# Patient Record
Sex: MALE | Race: WHITE | NOT HISPANIC OR LATINO | Employment: FULL TIME | URBAN - NONMETROPOLITAN AREA
[De-identification: names, ages, dates, MRNs, and addresses within clinical notes are randomized per-mention and may not be internally consistent; named-entity substitution may affect disease eponyms.]

---

## 2017-10-17 ENCOUNTER — OFFICE VISIT (OUTPATIENT)
Dept: UROLOGY | Facility: CLINIC | Age: 57
End: 2017-10-17

## 2017-10-17 VITALS
HEIGHT: 72 IN | TEMPERATURE: 98.6 F | BODY MASS INDEX: 32.51 KG/M2 | WEIGHT: 240 LBS | DIASTOLIC BLOOD PRESSURE: 77 MMHG | SYSTOLIC BLOOD PRESSURE: 128 MMHG

## 2017-10-17 DIAGNOSIS — R97.20 ELEVATED PSA: ICD-10-CM

## 2017-10-17 DIAGNOSIS — N40.1 BENIGN PROSTATIC HYPERPLASIA WITH LOWER URINARY TRACT SYMPTOMS, SYMPTOM DETAILS UNSPECIFIED: ICD-10-CM

## 2017-10-17 DIAGNOSIS — N39.0 URINARY TRACT INFECTION WITHOUT HEMATURIA, SITE UNSPECIFIED: Primary | ICD-10-CM

## 2017-10-17 LAB
BILIRUB BLD-MCNC: NEGATIVE MG/DL
CLARITY, POC: CLEAR
COLOR UR: YELLOW
GLUCOSE UR STRIP-MCNC: NEGATIVE MG/DL
KETONES UR QL: NEGATIVE
LEUKOCYTE EST, POC: NEGATIVE
NITRITE UR-MCNC: NEGATIVE MG/ML
PH UR: 5.5 [PH] (ref 5–8)
PROT UR STRIP-MCNC: NEGATIVE MG/DL
RBC # UR STRIP: NEGATIVE /UL
SP GR UR: 1.03 (ref 1–1.03)
UROBILINOGEN UR QL: NORMAL

## 2017-10-17 PROCEDURE — 51798 US URINE CAPACITY MEASURE: CPT | Performed by: UROLOGY

## 2017-10-17 PROCEDURE — 81003 URINALYSIS AUTO W/O SCOPE: CPT | Performed by: UROLOGY

## 2017-10-17 PROCEDURE — 99204 OFFICE O/P NEW MOD 45 MIN: CPT | Performed by: UROLOGY

## 2017-10-17 RX ORDER — SULFAMETHOXAZOLE AND TRIMETHOPRIM 800; 160 MG/1; MG/1
1 TABLET ORAL 2 TIMES DAILY
COMMUNITY
End: 2017-12-05

## 2017-10-17 NOTE — PROGRESS NOTES
Subjective    Mr. Delgado is 57 y.o. male    CHIEF COMPLAINT: Recurrent urinary tract infections    The patient comes in today has an interesting history in about a year ago he had a urinary tract infection apparently spontaneous fever chills dysuria was found to have Escherichia coli was treated with antibiotics for couple weeks and then it resolved that was his first infection however he did well until recently when he had recurrent symptoms again.  He was once again given antibiotic and took about 3-4 days her symptoms resolve but today he is asymptomatic in about a week ago that he got the antibiotics which was Bactrim DS.    His cultures did show Escherichia coli both times.    He is in between times she did not have any significant symptoms as a way score today is 14 I does not taking medications for his prostate I's had no gross hematuria no history of stones he is had no upper tract workup X rays no previous  operations    He also he is been found have an elevated PSA unfortunately both times it was drawn was when he had an acute urinary tract infection but does not look like he had any follow-up I discussed this with him there is no family history of prostate cancer and again he is not had any PSAs except those to that he got but while infected      History of Present Illness      The following portions of the patient's history were reviewed and updated as appropriate: allergies, current medications, past family history, past medical history, past social history, past surgical history and problem list.    Review of Systems   Constitutional: Negative for appetite change and fever.   HENT: Negative for hearing loss and sore throat.    Eyes: Negative for pain and redness.   Respiratory: Negative for cough and shortness of breath.    Cardiovascular: Negative for chest pain and leg swelling.   Gastrointestinal: Negative for nausea and vomiting.   Endocrine: Negative for cold intolerance and heat intolerance.  "  Genitourinary: Negative for difficulty urinating, dysuria, flank pain, frequency, hematuria and urgency.   Musculoskeletal: Negative for joint swelling and myalgias.   Skin: Negative for color change and rash.   Allergic/Immunologic: Negative for immunocompromised state.   Neurological: Negative for dizziness and speech difficulty.   Hematological: Negative for adenopathy. Does not bruise/bleed easily.   Psychiatric/Behavioral: Negative for dysphoric mood and suicidal ideas.         Current Outpatient Prescriptions:   •  sulfamethoxazole-trimethoprim (BACTRIM DS,SEPTRA DS) 800-160 MG per tablet, Take 1 tablet by mouth 2 (Two) Times a Day., Disp: , Rfl:     History reviewed. No pertinent past medical history.    History reviewed. No pertinent surgical history.    Social History     Social History   • Marital status: Single     Spouse name: N/A   • Number of children: N/A   • Years of education: N/A     Social History Main Topics   • Smoking status: Never Smoker   • Smokeless tobacco: Never Used   • Alcohol use None   • Drug use: None   • Sexual activity: Not Asked     Other Topics Concern   • None     Social History Narrative   • None       Family History   Problem Relation Age of Onset   • No Known Problems Father    • No Known Problems Mother        Objective    /77  Temp 98.6 °F (37 °C)  Ht 72\" (182.9 cm)  Wt 240 lb (109 kg)  BMI 32.55 kg/m2    Physical Exam   Constitutional: He is oriented to person, place, and time. He appears well-developed and well-nourished. No distress.   HENT:   Head: Normocephalic.   Nose: Nose normal.   Eyes: Conjunctivae are normal. No scleral icterus.   Neck: Normal range of motion. Neck supple. No tracheal deviation present. No thyromegaly present.   Cardiovascular: Normal rate and regular rhythm.    No significant edema or tenderness    Pulmonary/Chest: Effort normal. No accessory muscle usage. No respiratory distress.   Abdominal: Soft. Bowel sounds are normal. He " exhibits no distension, no ascites and no mass. There is no hepatosplenomegaly. There is no tenderness. There is no rebound, no guarding and no CVA tenderness. No hernia. Hernia confirmed negative in the right inguinal area and confirmed negative in the left inguinal area.   Soft, slightly poverweight  Non tender    Stool specimen is not indicated for my portion of the exam   Genitourinary: Testes normal and penis normal. Rectal exam shows no mass, no tenderness, anal tone normal and guaiac negative stool. Tender: no nodules. Right testis shows no mass, no swelling and no tenderness. Left testis shows no mass, no swelling and no tenderness. Uncircumcised. No penile tenderness (no lesion or deformities).   Genitourinary Comments:  The urethral meatus normal in position without evidence of stricture. Epididymis without mass or tenderness. Vas Deferens is palpably normal.Anus and perineum without mass or tenderness. The seminal vesicle are without mass or enlargement. The prostate is approximately 25 ml. It is Symmetric, with a Soft consistency. There are no nodules present. . The seminal vesicles are Not palpable due to the size of the prostate.     Lymphadenopathy:     He has no cervical adenopathy. No inguinal adenopathy noted on the right or left side.        Right: No inguinal adenopathy present.        Left: No inguinal adenopathy present.   Neurological: He is alert and oriented to person, place, and time.   Skin: Skin is warm and dry. No rash noted. He is not diaphoretic. No pallor.   Psychiatric: He has a normal mood and affect. His behavior is normal.   Vitals reviewed.        No results found for any previous visit.    Results for orders placed or performed in visit on 10/17/17   POC Urinalysis Dipstick, Automated   Result Value Ref Range    Color Yellow Yellow, Straw, Dark Yellow, Mitali    Clarity, UA Clear Clear    Glucose, UA Negative Negative, 1000 mg/dL (3+) mg/dL    Bilirubin Negative Negative     Ketones, UA Negative Negative    Specific Gravity  1.030 1.005 - 1.030    Blood, UA Negative Negative    pH, Urine 5.5 5.0 - 8.0    Protein, POC Negative Negative mg/dL    Urobilinogen, UA Normal Normal    Leukocytes Negative Negative    Nitrite, UA Negative Negative       Bladder Scan interpretation  Estimation of residual urine via abdominal ultrasound  Residual Urine: 152 ml  Indication: UTI  Position: Supine  Examination: Incremental scanning of the suprapubic area using 3 MHz transducer using copious amounts of acoustic gel.   Findings: An anechoic area was demonstrated which represented the bladder, with measurement of residual urine as noted. I inspected this myself. In that the residual urine was stable or insignificant, no treatment will be necessary at this time.         Assessment and Plan    Diagnoses and all orders for this visit:    Urinary tract infection without hematuria, site unspecified  -     POC Urinalysis Dipstick, Automated  -     XR pyelogram iv with tomography; Future    Benign prostatic hyperplasia with lower urinary tract symptoms, symptom details unspecified    Elevated PSA    Plan--I indicated to the patient from a urinary tract infection point review does not comment for patient's best female so have recurrent infections and we will look for an etiology he has no insurance on when to go ahead and get an IVP which should shows any obstruction or stones that may be causing this.    From elevated PSA point review I told him again I am hopeful that this is because he was infected both times I do want to wait to 3 months we will repeat that again down the road to make sure comes back down to normal I think he understands everything clearly we will seen back next week with an IVP and a cystoscopy sooner as needed

## 2017-10-24 ENCOUNTER — PROCEDURE VISIT (OUTPATIENT)
Dept: UROLOGY | Facility: CLINIC | Age: 57
End: 2017-10-24

## 2017-10-24 DIAGNOSIS — N40.1 BENIGN PROSTATIC HYPERPLASIA WITH LOWER URINARY TRACT SYMPTOMS, SYMPTOM DETAILS UNSPECIFIED: ICD-10-CM

## 2017-10-24 DIAGNOSIS — N39.0 URINARY TRACT INFECTION WITHOUT HEMATURIA, SITE UNSPECIFIED: Primary | ICD-10-CM

## 2017-10-24 LAB
BILIRUB BLD-MCNC: NEGATIVE MG/DL
CLARITY, POC: CLEAR
COLOR UR: YELLOW
GLUCOSE UR STRIP-MCNC: NEGATIVE MG/DL
KETONES UR QL: NEGATIVE
LEUKOCYTE EST, POC: NEGATIVE
NITRITE UR-MCNC: NEGATIVE MG/ML
PH UR: 5.5 [PH] (ref 5–8)
PROT UR STRIP-MCNC: NEGATIVE MG/DL
RBC # UR STRIP: NEGATIVE /UL
SP GR UR: 1.02 (ref 1–1.03)
UROBILINOGEN UR QL: NORMAL

## 2017-10-24 PROCEDURE — 81003 URINALYSIS AUTO W/O SCOPE: CPT | Performed by: UROLOGY

## 2017-10-24 PROCEDURE — 52000 CYSTOURETHROSCOPY: CPT | Performed by: UROLOGY

## 2017-10-24 NOTE — PROGRESS NOTES
.Pre- operative diagnosis:  Benign prostatic hypertrophy    Post operative diagnosis:  Same    Procedure:  The patient was prepped and draped in a normal sterile fashion.  The urethra was anesthetized with 2% lidocaine jelly.  A flexible cystoscope was introduced per urethra.      Urethra:  Normal    Bladder:  There is no evidence of a stone, foreign body or mass within the bladder.  The bladder is minimally trabeculated.  The bladder neck is without contracture., Normal mucosa and No bladder tumor    Ureteral orifices:  Normal position bilaterally and Clear efflux bilaterally    Prostate:  lateral lobe hypertrophyminimal    Patient tolerated the procedure well    Complications: none    Blood loss: minimal    Follow up:    Routine follow up    The patient is doing well his urinalysis is again clear today unfortunately he did not get the IVP is ordered some was seen back again in 6 weeks we will want to repeat that PSA at that time and also we will do an IVP that he had no further questions

## 2017-10-29 ENCOUNTER — RESULTS ENCOUNTER (OUTPATIENT)
Dept: UROLOGY | Facility: CLINIC | Age: 57
End: 2017-10-29

## 2017-10-29 DIAGNOSIS — N39.0 URINARY TRACT INFECTION WITHOUT HEMATURIA, SITE UNSPECIFIED: ICD-10-CM

## 2017-12-05 ENCOUNTER — HOSPITAL ENCOUNTER (OUTPATIENT)
Dept: GENERAL RADIOLOGY | Facility: HOSPITAL | Age: 57
Discharge: HOME | End: 2017-12-05
Attending: UROLOGY | Admitting: UROLOGY

## 2017-12-05 ENCOUNTER — OFFICE VISIT (OUTPATIENT)
Dept: UROLOGY | Facility: CLINIC | Age: 57
End: 2017-12-05

## 2017-12-05 VITALS — BODY MASS INDEX: 33.05 KG/M2 | HEIGHT: 72 IN | WEIGHT: 244 LBS | TEMPERATURE: 97.3 F

## 2017-12-05 DIAGNOSIS — N20.0 RENAL STONE: ICD-10-CM

## 2017-12-05 DIAGNOSIS — N40.1 BENIGN PROSTATIC HYPERPLASIA WITH LOWER URINARY TRACT SYMPTOMS, SYMPTOM DETAILS UNSPECIFIED: Primary | ICD-10-CM

## 2017-12-05 DIAGNOSIS — N39.0 URINARY TRACT INFECTION WITHOUT HEMATURIA, SITE UNSPECIFIED: ICD-10-CM

## 2017-12-05 DIAGNOSIS — R97.20 ELEVATED PSA: ICD-10-CM

## 2017-12-05 LAB
BILIRUB BLD-MCNC: NEGATIVE MG/DL
CLARITY, POC: CLEAR
COLOR UR: YELLOW
CREAT BLDA-MCNC: 1 MG/DL (ref 0.6–1.3)
GLUCOSE UR STRIP-MCNC: NEGATIVE MG/DL
KETONES UR QL: NEGATIVE
LEUKOCYTE EST, POC: NEGATIVE
NITRITE UR-MCNC: NEGATIVE MG/ML
PH UR: 5 [PH] (ref 5–8)
PROT UR STRIP-MCNC: NEGATIVE MG/DL
PSA SERPL-MCNC: 3.59 NG/ML (ref 0–4)
RBC # UR STRIP: ABNORMAL /UL
SP GR UR: 1.01 (ref 1–1.03)
UROBILINOGEN UR QL: NORMAL

## 2017-12-05 PROCEDURE — 0 IOPAMIDOL 61 % SOLUTION: Performed by: UROLOGY

## 2017-12-05 PROCEDURE — 81003 URINALYSIS AUTO W/O SCOPE: CPT | Performed by: UROLOGY

## 2017-12-05 PROCEDURE — 82565 ASSAY OF CREATININE: CPT

## 2017-12-05 PROCEDURE — 74400 UROGRAPHY IV +-KUB TOMOG: CPT

## 2017-12-05 PROCEDURE — 99213 OFFICE O/P EST LOW 20 MIN: CPT | Performed by: UROLOGY

## 2017-12-05 RX ADMIN — IOPAMIDOL 100 ML: 612 INJECTION, SOLUTION INTRAVENOUS at 09:48

## 2017-12-05 NOTE — PROGRESS NOTES
Subjective    Mr. Delgado is 57 y.o. male    CHIEF COMPLAINT: Recurrent UTI    The patient is back today with an IVP this was reviewed and shows only a small nonobstructing stone in the left lower pole otherwise I felt it was perfectly normal and no evidence of any any etiology for recurrent UTIs    He also has a previous history of elevated PSA that was drawn during infection he has not had one since I do want to go ahead and repeat that today.    If that is normal and on when checking back in a year with a repeat PSA    He is asymptomatic and his urine is clear today        History of Present Illness      The following portions of the patient's history were reviewed and updated as appropriate: allergies, current medications, past family history, past medical history, past social history, past surgical history and problem list.    Review of Systems   Constitutional: Negative.  Negative for chills and fever.   Gastrointestinal: Negative for abdominal distention, abdominal pain, anal bleeding, blood in stool and nausea.   Genitourinary: Negative for difficulty urinating, dysuria, flank pain, frequency, hematuria and urgency.   Psychiatric/Behavioral: Negative.  Negative for agitation and confusion.       No current outpatient prescriptions on file.  No current facility-administered medications for this visit.     Facility-Administered Medications Ordered in Other Visits:   •  iopamidol (ISOVUE-300) 61 % injection 10 mL, 10 mL, Intravenous, Once in imaging, Yash Matos MD    History reviewed. No pertinent past medical history.    No past surgical history on file.    Social History     Social History   • Marital status: Single     Spouse name: N/A   • Number of children: N/A   • Years of education: N/A     Social History Main Topics   • Smoking status: Never Smoker   • Smokeless tobacco: Never Used   • Alcohol use None   • Drug use: None   • Sexual activity: Not Asked     Other Topics Concern   • None     Social  "History Narrative       Family History   Problem Relation Age of Onset   • No Known Problems Father    • No Known Problems Mother        Objective    Temp 97.3 °F (36.3 °C)  Ht 182.9 cm (72\")  Wt 111 kg (244 lb)  BMI 33.09 kg/m2    Physical Exam      Procedure visit on 10/24/2017   Component Date Value Ref Range Status   • Color 10/24/2017 Yellow  Yellow, Straw, Dark Yellow, Mitali Final   • Clarity, UA 10/24/2017 Clear  Clear Final   • Glucose, UA 10/24/2017 Negative  Negative, 1000 mg/dL (3+) mg/dL Final   • Bilirubin 10/24/2017 Negative  Negative Final   • Ketones, UA 10/24/2017 Negative  Negative Final   • Specific Gravity  10/24/2017 1.025  1.005 - 1.030 Final   • Blood, UA 10/24/2017 Negative  Negative Final   • pH, Urine 10/24/2017 5.5  5.0 - 8.0 Final   • Protein, POC 10/24/2017 Negative  Negative mg/dL Final   • Urobilinogen, UA 10/24/2017 Normal  Normal Final   • Leukocytes 10/24/2017 Negative  Negative Final   • Nitrite, UA 10/24/2017 Negative  Negative Final       Results for orders placed or performed in visit on 12/05/17   POC Urinalysis Dipstick, Automated   Result Value Ref Range    Color Yellow Yellow, Straw, Dark Yellow, Mitali    Clarity, UA Clear Clear    Glucose, UA Negative Negative, 1000 mg/dL (3+) mg/dL    Bilirubin Negative Negative    Ketones, UA Negative Negative    Specific Gravity  1.010 1.005 - 1.030    Blood, UA Trace (A) Negative    pH, Urine 5.0 5.0 - 8.0    Protein, POC Negative Negative mg/dL    Urobilinogen, UA Normal Normal    Leukocytes Negative Negative    Nitrite, UA Negative Negative       Assessment and Plan    Diagnoses and all orders for this visit:    Benign prostatic hyperplasia with lower urinary tract symptoms, symptom details unspecified  -     POC Urinalysis Dipstick, Automated  -     PSA  -     PSA; Future    Renal stone  -     XR Abdomen KUB; Future    Elevated PSA    Plan--I discussed a renal stone within this is a new diagnosis and this is nonobstructing I just " recommended a follow-up in a year with a KUB    I have see no evidence of any etiology for recurrent UTIs cystoscopy was basically benign and a CT shows only a nonobstructing small stones so what I do not think this is causing this    I did discussed the PSA with him I were going to seen back in a year with a PSA and we will check one today and leg wasresultsofitsabnormalthanwewillneedtoplananotherfollow-upIthinkheunderstoodeverythingclearly

## 2017-12-10 ENCOUNTER — RESULTS ENCOUNTER (OUTPATIENT)
Dept: UROLOGY | Facility: CLINIC | Age: 57
End: 2017-12-10

## 2017-12-10 DIAGNOSIS — N40.1 BENIGN PROSTATIC HYPERPLASIA WITH LOWER URINARY TRACT SYMPTOMS, SYMPTOM DETAILS UNSPECIFIED: ICD-10-CM

## 2017-12-20 ENCOUNTER — TELEPHONE (OUTPATIENT)
Dept: UROLOGY | Facility: CLINIC | Age: 57
End: 2017-12-20

## 2018-04-10 ENCOUNTER — TELEPHONE (OUTPATIENT)
Dept: UROLOGY | Facility: CLINIC | Age: 58
End: 2018-04-10

## 2018-04-10 DIAGNOSIS — N20.0 RENAL STONE: Primary | ICD-10-CM

## 2018-04-13 ENCOUNTER — HOSPITAL ENCOUNTER (OUTPATIENT)
Dept: GENERAL RADIOLOGY | Facility: HOSPITAL | Age: 58
Discharge: HOME | End: 2018-04-13
Attending: UROLOGY | Admitting: UROLOGY

## 2018-04-13 ENCOUNTER — OFFICE VISIT (OUTPATIENT)
Dept: UROLOGY | Facility: CLINIC | Age: 58
End: 2018-04-13

## 2018-04-13 VITALS
TEMPERATURE: 96.5 F | BODY MASS INDEX: 33.18 KG/M2 | WEIGHT: 245 LBS | HEIGHT: 72 IN | DIASTOLIC BLOOD PRESSURE: 88 MMHG | SYSTOLIC BLOOD PRESSURE: 180 MMHG

## 2018-04-13 DIAGNOSIS — N20.0 RENAL STONE: Primary | ICD-10-CM

## 2018-04-13 DIAGNOSIS — N40.1 BENIGN PROSTATIC HYPERPLASIA WITH LOWER URINARY TRACT SYMPTOMS, SYMPTOM DETAILS UNSPECIFIED: ICD-10-CM

## 2018-04-13 DIAGNOSIS — N20.1 URETERAL STONE: ICD-10-CM

## 2018-04-13 DIAGNOSIS — N20.0 RENAL STONE: ICD-10-CM

## 2018-04-13 DIAGNOSIS — R97.20 ELEVATED PSA: ICD-10-CM

## 2018-04-13 PROCEDURE — 81001 URINALYSIS AUTO W/SCOPE: CPT | Performed by: UROLOGY

## 2018-04-13 PROCEDURE — 74018 RADEX ABDOMEN 1 VIEW: CPT

## 2018-04-13 PROCEDURE — 99214 OFFICE O/P EST MOD 30 MIN: CPT | Performed by: UROLOGY

## 2018-04-13 RX ORDER — SULFAMETHOXAZOLE AND TRIMETHOPRIM 800; 160 MG/1; MG/1
1 TABLET ORAL 2 TIMES DAILY
COMMUNITY
End: 2018-05-07

## 2018-04-13 RX ORDER — HYDROCODONE BITARTRATE AND ACETAMINOPHEN 5; 325 MG/1; MG/1
1 TABLET ORAL EVERY 6 HOURS PRN
COMMUNITY
End: 2022-04-02

## 2018-04-13 RX ORDER — TAMSULOSIN HYDROCHLORIDE 0.4 MG/1
1 CAPSULE ORAL DAILY
Qty: 30 CAPSULE | Refills: 5 | Status: SHIPPED | OUTPATIENT
Start: 2018-04-13 | End: 2022-04-02

## 2018-04-13 NOTE — PROGRESS NOTES
Subjective    Mr. Delgado is 57 y.o. male    CHIEF COMPLAINT: Left flank pain    The patient comes in today with a new problem which is left flank pain apparently over the weekend her a few days earlier he had severe episodes of nausea and vomiting also had some diarrhea certainly sound like a typical GI bug however his nausea and vomiting persisted he has also been noticing some left flank pain which is new for him I he has not known renal stone that we diagnosed some time ago but he has never passed a stone before he does have a strong family history of stones and his brother had lots of stones.    He is not had any gross hematuria no fever socially with this he did get a urine culture that was no growth recently his urinalysis here today is clear.    Interestingly enough on a KUB today the stone that was seen in the lower pole was now in the mid to upper ureter on the left    KUB independent review    A KUB is available for me to review today.  The image is inspected for a bowel gas pattern and the general bone structure of the spine and pelvis. The kidneys are then inspected closely.  Renal outline is noted if identifiable. The kidney, collecting system, and anticipated path of the ureter are examined for calcifications including those in the true pelvis.  This film reveals:    On the right there are no calcificaitons seen in the kidney or the expected course of the ureter. .    On the left there is a single proximal ureteral stone measuring 6 mm.      History of Present Illness      The following portions of the patient's history were reviewed and updated as appropriate: allergies, current medications, past family history, past medical history, past social history, past surgical history and problem list.    Review of Systems   Constitutional: Negative for chills and fever.   Gastrointestinal: Negative for abdominal pain, anal bleeding and blood in stool.   Genitourinary: Positive for flank pain (not today but  "has been having pain). Negative for frequency, hematuria and urgency.         Current Outpatient Prescriptions:   •  HYDROcodone-acetaminophen (NORCO) 5-325 MG per tablet, Take 1 tablet by mouth Every 6 (Six) Hours As Needed., Disp: , Rfl:   •  sulfamethoxazole-trimethoprim (BACTRIM DS,SEPTRA DS) 800-160 MG per tablet, Take 1 tablet by mouth 2 (Two) Times a Day., Disp: , Rfl:   •  tamsulosin (FLOMAX) 0.4 MG capsule 24 hr capsule, Take 1 capsule by mouth Daily., Disp: 30 capsule, Rfl: 5    Past Medical History:   Diagnosis Date   • Hypertension        History reviewed. No pertinent surgical history.    Social History     Social History   • Marital status: Single     Social History Main Topics   • Smoking status: Former Smoker   • Smokeless tobacco: Never Used   • Drug use: Unknown     Other Topics Concern   • Not on file       Family History   Problem Relation Age of Onset   • No Known Problems Father    • No Known Problems Mother        Objective    /88   Temp 96.5 °F (35.8 °C)   Ht 182.9 cm (72\")   Wt 111 kg (245 lb)   BMI 33.23 kg/m²     Physical Exam      Hospital Outpatient Visit on 12/05/2017   Component Date Value Ref Range Status   • Creatinine 12/05/2017 1.00  0.60 - 1.30 mg/dL Final       Results for orders placed or performed in visit on 04/13/18   POC Urinalysis Dipstick, Automated   Result Value Ref Range    Color Yellow Yellow, Straw, Dark Yellow, Mitali    Clarity, UA Clear Clear    Glucose, UA Negative Negative, 1000 mg/dL (3+) mg/dL    Bilirubin Negative Negative    Ketones, UA Negative Negative    Specific Gravity  1.030 1.005 - 1.030    Blood, UA Negative Negative    pH, Urine 5.5 5.0 - 8.0    Protein, POC Negative Negative mg/dL    Urobilinogen, UA Normal Normal    Leukocytes Negative Negative    Nitrite, UA Negative Negative       Assessment and Plan    Diagnoses and all orders for this visit:    Renal stone  -     POC Urinalysis Dipstick, Automated  -     tamsulosin (FLOMAX) 0.4 MG " capsule 24 hr capsule; Take 1 capsule by mouth Daily.    Benign prostatic hyperplasia with lower urinary tract symptoms, symptom details unspecified    Elevated PSA    Ureteral stone  -     XR Abdomen KUB; Future    Plan--I discussed the options with him at length actually most of his symptoms pretty much now resolved and he does not require any pain medication last 24-48 hours I did indicate to them that this is pretty much a borderline stone as far as passing it and that he may not be able to but he would like to try to pass the stone.    Again I instructions were given regarding increasing pain fever etc. I did give him some Flomax to take see if this might help him pass the stone.    On was seen back again in 1 week with a KUB if his symptoms worsen or fever etc. he will let us know prior to that

## 2018-04-13 NOTE — PATIENT INSTRUCTIONS

## 2018-04-19 ENCOUNTER — HOSPITAL ENCOUNTER (OUTPATIENT)
Dept: GENERAL RADIOLOGY | Facility: HOSPITAL | Age: 58
Discharge: HOME | End: 2018-04-19
Attending: UROLOGY | Admitting: UROLOGY

## 2018-04-19 ENCOUNTER — OFFICE VISIT (OUTPATIENT)
Dept: UROLOGY | Facility: CLINIC | Age: 58
End: 2018-04-19

## 2018-04-19 VITALS
BODY MASS INDEX: 34.35 KG/M2 | TEMPERATURE: 97.5 F | WEIGHT: 253.6 LBS | DIASTOLIC BLOOD PRESSURE: 100 MMHG | SYSTOLIC BLOOD PRESSURE: 150 MMHG | HEIGHT: 72 IN

## 2018-04-19 DIAGNOSIS — R97.20 ELEVATED PSA: ICD-10-CM

## 2018-04-19 DIAGNOSIS — N20.0 RENAL STONE: Primary | ICD-10-CM

## 2018-04-19 DIAGNOSIS — N40.1 BENIGN PROSTATIC HYPERPLASIA WITH LOWER URINARY TRACT SYMPTOMS, SYMPTOM DETAILS UNSPECIFIED: ICD-10-CM

## 2018-04-19 DIAGNOSIS — N20.1 URETERAL STONE: ICD-10-CM

## 2018-04-19 LAB
BILIRUB BLD-MCNC: NEGATIVE MG/DL
CLARITY, POC: CLEAR
COLOR UR: YELLOW
GLUCOSE UR STRIP-MCNC: NEGATIVE MG/DL
KETONES UR QL: NEGATIVE
LEUKOCYTE EST, POC: ABNORMAL
NITRITE UR-MCNC: NEGATIVE MG/ML
PH UR: 5.5 [PH] (ref 5–8)
PROT UR STRIP-MCNC: NEGATIVE MG/DL
RBC # UR STRIP: ABNORMAL /UL
SP GR UR: 1.02 (ref 1–1.03)
UROBILINOGEN UR QL: NORMAL

## 2018-04-19 PROCEDURE — 99213 OFFICE O/P EST LOW 20 MIN: CPT | Performed by: UROLOGY

## 2018-04-19 PROCEDURE — 74018 RADEX ABDOMEN 1 VIEW: CPT

## 2018-04-19 PROCEDURE — 81001 URINALYSIS AUTO W/SCOPE: CPT | Performed by: UROLOGY

## 2018-04-19 NOTE — PATIENT INSTRUCTIONS

## 2018-04-19 NOTE — PROGRESS NOTES
Subjective    Mr. Delgado is 57 y.o. male    CHIEF COMPLAINT: Ureteral stone    Patient comes back today for follow-up of a stone since we seen he really been doing reasonably well he is not any further pain and he thinks he might of passed all he really did not pass anything.    Unfortunately on the KUB the stone is not exactly the same position    KUB independent review    A KUB is available for me to review today.  The image is inspected for a bowel gas pattern and the general bone structure of the spine and pelvis. The kidneys are then inspected closely.  Renal outline is noted if identifiable. The kidney, collecting system, and anticipated path of the ureter are examined for calcifications including those in the true pelvis.  This film reveals:    On the right there are no calcificaitons seen in the kidney or the expected course of the ureter. .    On the left there is a single proximal ureteral stone measuring 7 mm.      History of Present Illness      The following portions of the patient's history were reviewed and updated as appropriate: allergies, current medications, past family history, past medical history, past social history, past surgical history and problem list.    Review of Systems   Constitutional: Negative.  Negative for chills and fever.   Gastrointestinal: Negative for abdominal distention, abdominal pain, anal bleeding, blood in stool and nausea.   Genitourinary: Negative for difficulty urinating, dysuria, flank pain, frequency, hematuria and urgency.   Psychiatric/Behavioral: Negative.  Negative for agitation and confusion.         Current Outpatient Prescriptions:   •  HYDROcodone-acetaminophen (NORCO) 5-325 MG per tablet, Take 1 tablet by mouth Every 6 (Six) Hours As Needed., Disp: , Rfl:   •  sulfamethoxazole-trimethoprim (BACTRIM DS,SEPTRA DS) 800-160 MG per tablet, Take 1 tablet by mouth 2 (Two) Times a Day., Disp: , Rfl:   •  tamsulosin (FLOMAX) 0.4 MG capsule 24 hr capsule, Take 1  "capsule by mouth Daily., Disp: 30 capsule, Rfl: 5    Past Medical History:   Diagnosis Date   • Hypertension        History reviewed. No pertinent surgical history.    Social History     Social History   • Marital status: Single     Social History Main Topics   • Smoking status: Former Smoker   • Smokeless tobacco: Never Used   • Drug use: Unknown     Other Topics Concern   • Not on file       Family History   Problem Relation Age of Onset   • No Known Problems Father    • No Known Problems Mother        Objective    /100   Temp 97.5 °F (36.4 °C)   Ht 182.9 cm (72\")   Wt 115 kg (253 lb 9.6 oz)   BMI 34.39 kg/m²     Physical Exam      Office Visit on 04/13/2018   Component Date Value Ref Range Status   • Color 04/13/2018 Yellow  Yellow, Straw, Dark Yellow, Mitali Final   • Clarity, UA 04/13/2018 Clear  Clear Final   • Glucose, UA 04/13/2018 Negative  Negative, 1000 mg/dL (3+) mg/dL Final   • Bilirubin 04/13/2018 Negative  Negative Final   • Ketones, UA 04/13/2018 Negative  Negative Final   • Specific Gravity  04/13/2018 1.030  1.005 - 1.030 Final   • Blood, UA 04/13/2018 Negative  Negative Final   • pH, Urine 04/13/2018 5.5  5.0 - 8.0 Final   • Protein, POC 04/13/2018 Negative  Negative mg/dL Final   • Urobilinogen, UA 04/13/2018 Normal  Normal Final   • Leukocytes 04/13/2018 Negative  Negative Final   • Nitrite, UA 04/13/2018 Negative  Negative Final       Results for orders placed or performed in visit on 04/19/18   POC Urinalysis Dipstick, Automated   Result Value Ref Range    Color Yellow Yellow, Straw, Dark Yellow, Mitali    Clarity, UA Clear Clear    Glucose, UA Negative Negative, 1000 mg/dL (3+) mg/dL    Bilirubin Negative Negative    Ketones, UA Negative Negative    Specific Gravity  1.025 1.005 - 1.030    Blood, UA Trace (A) Negative    pH, Urine 5.5 5.0 - 8.0    Protein, POC Negative Negative mg/dL    Urobilinogen, UA Normal Normal    Leukocytes Moderate (2+) (A) Negative    Nitrite, UA Negative " Negative       Assessment and Plan    Diagnoses and all orders for this visit:    Renal stone  -     POC Urinalysis Dipstick, Automated  -     XR Abdomen KUB; Future    Benign prostatic hyperplasia with lower urinary tract symptoms, symptom details unspecified    Elevated PSA    Ureteral stone    Plan--I discussed the options with the patient regarding management again I told him at this position the ESWL would be the best treatment I discussed risk complications how we do the procedure etc. etc.    However importantly he does not have any insurance he is not had any symptoms or he wants to continue seeing a Passable stone I do not think that is unreasonablewarnings were given that if he starts having severe pain fever etc. he will let us know prior to that.    On was seen back again in 2 weeks with a KUB

## 2018-05-03 ENCOUNTER — PREP FOR SURGERY (OUTPATIENT)
Dept: OTHER | Facility: HOSPITAL | Age: 58
End: 2018-05-03

## 2018-05-03 ENCOUNTER — OFFICE VISIT (OUTPATIENT)
Dept: UROLOGY | Facility: CLINIC | Age: 58
End: 2018-05-03

## 2018-05-03 ENCOUNTER — HOSPITAL ENCOUNTER (OUTPATIENT)
Dept: GENERAL RADIOLOGY | Facility: HOSPITAL | Age: 58
Discharge: HOME | End: 2018-05-03
Attending: UROLOGY | Admitting: UROLOGY

## 2018-05-03 VITALS
WEIGHT: 246.8 LBS | SYSTOLIC BLOOD PRESSURE: 148 MMHG | HEIGHT: 72 IN | DIASTOLIC BLOOD PRESSURE: 72 MMHG | BODY MASS INDEX: 33.43 KG/M2 | TEMPERATURE: 96.7 F

## 2018-05-03 DIAGNOSIS — N20.0 RENAL STONE: ICD-10-CM

## 2018-05-03 DIAGNOSIS — N20.0 RENAL STONE: Primary | ICD-10-CM

## 2018-05-03 DIAGNOSIS — N20.1 URETERAL STONE: ICD-10-CM

## 2018-05-03 DIAGNOSIS — N20.1 URETERAL STONE: Primary | ICD-10-CM

## 2018-05-03 LAB
BILIRUB BLD-MCNC: NEGATIVE MG/DL
CLARITY, POC: CLEAR
COLOR UR: YELLOW
GLUCOSE UR STRIP-MCNC: NEGATIVE MG/DL
KETONES UR QL: NEGATIVE
LEUKOCYTE EST, POC: NEGATIVE
NITRITE UR-MCNC: NEGATIVE MG/ML
PH UR: 5.5 [PH] (ref 5–8)
PROT UR STRIP-MCNC: ABNORMAL MG/DL
RBC # UR STRIP: ABNORMAL /UL
SP GR UR: 1.02 (ref 1–1.03)
UROBILINOGEN UR QL: NORMAL

## 2018-05-03 PROCEDURE — 99213 OFFICE O/P EST LOW 20 MIN: CPT | Performed by: UROLOGY

## 2018-05-03 PROCEDURE — 74018 RADEX ABDOMEN 1 VIEW: CPT

## 2018-05-03 NOTE — PROGRESS NOTES
Subjective    Mr. Delgado is 57 y.o. male    CHIEF COMPLAINT: Flank pain and ureteral stone    The patient comes back today he has noticed a rather increasing pain again the last several days he has little bit better today but again some nausea and vomiting left flank pain etc. no gross hematuria no fever associated with this his urinalysis today is clear.    The KUB shows stone may have moved a little bit but just maybe L3 4 and L4 5 so still pretty much in the mid upper ureter    KUB independent review    A KUB is available for me to review today.  The image is inspected for a bowel gas pattern and the general bone structure of the spine and pelvis. The kidneys are then inspected closely.  Renal outline is noted if identifiable. The kidney, collecting system, and anticipated path of the ureter are examined for calcifications including those in the true pelvis.  This film reveals:    On the right there are no calcificaitons seen in the kidney or the expected course of the ureter. .    On the left there is a single mid-ureteral stone measuring 7 mm.      History of Present Illness      The following portions of the patient's history were reviewed and updated as appropriate: allergies, current medications, past family history, past medical history, past social history, past surgical history and problem list.    Review of Systems   Constitutional: Negative for chills and fever.   Gastrointestinal: Negative for abdominal pain, anal bleeding and blood in stool.   Genitourinary: Positive for flank pain (left). Negative for frequency, hematuria and urgency.         Current Outpatient Prescriptions:   •  tamsulosin (FLOMAX) 0.4 MG capsule 24 hr capsule, Take 1 capsule by mouth Daily., Disp: 30 capsule, Rfl: 5  •  HYDROcodone-acetaminophen (NORCO) 5-325 MG per tablet, Take 1 tablet by mouth Every 6 (Six) Hours As Needed., Disp: , Rfl:   •  sulfamethoxazole-trimethoprim (BACTRIM DS,SEPTRA DS) 800-160 MG per tablet, Take 1  "tablet by mouth 2 (Two) Times a Day., Disp: , Rfl:     Past Medical History:   Diagnosis Date   • Hypertension    • Kidney stone        History reviewed. No pertinent surgical history.    Social History     Social History   • Marital status: Single     Social History Main Topics   • Smoking status: Former Smoker   • Smokeless tobacco: Never Used   • Alcohol use Yes   • Drug use: Unknown     Other Topics Concern   • Not on file       Family History   Problem Relation Age of Onset   • Kidney disease Father    • No Known Problems Mother        Objective    /72   Temp 96.7 °F (35.9 °C)   Ht 182.9 cm (72\")   Wt 112 kg (246 lb 12.8 oz)   BMI 33.47 kg/m²     Physical Exam      Office Visit on 04/19/2018   Component Date Value Ref Range Status   • Color 04/19/2018 Yellow  Yellow, Straw, Dark Yellow, Mitali Final   • Clarity, UA 04/19/2018 Clear  Clear Final   • Glucose, UA 04/19/2018 Negative  Negative, 1000 mg/dL (3+) mg/dL Final   • Bilirubin 04/19/2018 Negative  Negative Final   • Ketones, UA 04/19/2018 Negative  Negative Final   • Specific Gravity  04/19/2018 1.025  1.005 - 1.030 Final   • Blood, UA 04/19/2018 Trace* Negative Final   • pH, Urine 04/19/2018 5.5  5.0 - 8.0 Final   • Protein, POC 04/19/2018 Negative  Negative mg/dL Final   • Urobilinogen, UA 04/19/2018 Normal  Normal Final   • Leukocytes 04/19/2018 Moderate (2+)* Negative Final   • Nitrite, UA 04/19/2018 Negative  Negative Final       Results for orders placed or performed in visit on 05/03/18   POC Urinalysis Dipstick, Automated   Result Value Ref Range    Color Yellow Yellow, Straw, Dark Yellow, Mitali    Clarity, UA Clear Clear    Glucose, UA Negative Negative, 1000 mg/dL (3+) mg/dL    Bilirubin Negative Negative    Ketones, UA Negative Negative    Specific Gravity  1.025 1.005 - 1.030    Blood, UA Small (A) Negative    pH, Urine 5.5 5.0 - 8.0    Protein,  mg/dL (A) Negative mg/dL    Urobilinogen, UA Normal Normal    Leukocytes Negative " Negative    Nitrite, UA Negative Negative     Microscopic Urinalysis  I inspected the urine myself based on the clinical situation including the dipstick urine. The urine is spun in a centrifuge for three minutes. The spun urine shows 7-12 rbc/hpf, none wbc/hpf, none epi/hpf, negative bacteria, negative crystals, and negative casts.     Assessment and Plan    Andres was seen today for flank pain.    Diagnoses and all orders for this visit:    Renal stone  -     POC Urinalysis Dipstick, Automated    Ureteral stone    Plan--I discussed the options with the patient length I think he is anxious to ready now to go ahead and proceed to I discussed ESWL with him I think he would be a good candidate risk complications discussed and all questions were answered and we will do this next week has any problems pain etc. prior then he will call    The patient requested a refill of his pain medication went ahead running prescription for 15 of the Norco 5/325 we will check a Geraldine

## 2018-05-07 ENCOUNTER — APPOINTMENT (OUTPATIENT)
Dept: PREADMISSION TESTING | Facility: HOSPITAL | Age: 58
End: 2018-05-07

## 2018-05-07 VITALS
OXYGEN SATURATION: 100 % | WEIGHT: 249.56 LBS | HEIGHT: 72 IN | HEART RATE: 105 BPM | BODY MASS INDEX: 33.8 KG/M2 | SYSTOLIC BLOOD PRESSURE: 162 MMHG | DIASTOLIC BLOOD PRESSURE: 95 MMHG | RESPIRATION RATE: 18 BRPM

## 2018-05-07 LAB
ANION GAP SERPL CALCULATED.3IONS-SCNC: 8 MMOL/L (ref 4–13)
BACTERIA UR QL AUTO: ABNORMAL /HPF
BILIRUB UR QL STRIP: NEGATIVE
BUN BLD-MCNC: 18 MG/DL (ref 5–21)
BUN/CREAT SERPL: 14.5 (ref 7–25)
CALCIUM SPEC-SCNC: 9.4 MG/DL (ref 8.4–10.4)
CHLORIDE SERPL-SCNC: 97 MMOL/L (ref 98–110)
CLARITY UR: CLEAR
CO2 SERPL-SCNC: 31 MMOL/L (ref 24–31)
COLOR UR: YELLOW
CREAT BLD-MCNC: 1.24 MG/DL (ref 0.5–1.4)
DEPRECATED RDW RBC AUTO: 40.8 FL (ref 40–54)
ERYTHROCYTE [DISTWIDTH] IN BLOOD BY AUTOMATED COUNT: 12.5 % (ref 12–15)
GFR SERPL CREATININE-BSD FRML MDRD: 60 ML/MIN/1.73
GLUCOSE BLD-MCNC: 124 MG/DL (ref 70–100)
GLUCOSE UR STRIP-MCNC: NEGATIVE MG/DL
HCT VFR BLD AUTO: 35.9 % (ref 40–52)
HGB BLD-MCNC: 11.9 G/DL (ref 14–18)
HGB UR QL STRIP.AUTO: ABNORMAL
HYALINE CASTS UR QL AUTO: ABNORMAL /LPF
KETONES UR QL STRIP: NEGATIVE
LEUKOCYTE ESTERASE UR QL STRIP.AUTO: ABNORMAL
MCH RBC QN AUTO: 29.8 PG (ref 28–32)
MCHC RBC AUTO-ENTMCNC: 33.1 G/DL (ref 33–36)
MCV RBC AUTO: 90 FL (ref 82–95)
NITRITE UR QL STRIP: NEGATIVE
PH UR STRIP.AUTO: 5.5 [PH] (ref 5–8)
PLATELET # BLD AUTO: 338 10*3/MM3 (ref 130–400)
PMV BLD AUTO: 9.3 FL (ref 6–12)
POTASSIUM BLD-SCNC: 4 MMOL/L (ref 3.5–5.3)
PROT UR QL STRIP: ABNORMAL
RBC # BLD AUTO: 3.99 10*6/MM3 (ref 4.8–5.9)
RBC # UR: ABNORMAL /HPF
REF LAB TEST METHOD: ABNORMAL
SODIUM BLD-SCNC: 136 MMOL/L (ref 135–145)
SP GR UR STRIP: 1.03 (ref 1–1.03)
SQUAMOUS #/AREA URNS HPF: ABNORMAL /HPF
UROBILINOGEN UR QL STRIP: ABNORMAL
WBC NRBC COR # BLD: 12.02 10*3/MM3 (ref 4.8–10.8)
WBC UR QL AUTO: ABNORMAL /HPF

## 2018-05-07 PROCEDURE — 93010 ELECTROCARDIOGRAM REPORT: CPT | Performed by: INTERNAL MEDICINE

## 2018-05-07 PROCEDURE — 87088 URINE BACTERIA CULTURE: CPT | Performed by: UROLOGY

## 2018-05-07 PROCEDURE — 80048 BASIC METABOLIC PNL TOTAL CA: CPT | Performed by: UROLOGY

## 2018-05-07 PROCEDURE — 85027 COMPLETE CBC AUTOMATED: CPT | Performed by: UROLOGY

## 2018-05-07 PROCEDURE — 87186 SC STD MICRODIL/AGAR DIL: CPT | Performed by: UROLOGY

## 2018-05-07 PROCEDURE — 81001 URINALYSIS AUTO W/SCOPE: CPT | Performed by: UROLOGY

## 2018-05-07 PROCEDURE — 93005 ELECTROCARDIOGRAM TRACING: CPT

## 2018-05-07 PROCEDURE — 87086 URINE CULTURE/COLONY COUNT: CPT | Performed by: UROLOGY

## 2018-05-07 PROCEDURE — 36415 COLL VENOUS BLD VENIPUNCTURE: CPT

## 2018-05-07 NOTE — DISCHARGE INSTRUCTIONS
DAY OF SURGERY INSTRUCTIONS        YOUR SURGEON: DEBBIE BUCHANAN     PROCEDURE: LEFT EXTRACORPOREAL SHOCKWAVE LITHOTRIPSY    DATE OF SURGERY: MAY 9TH 2018    ARRIVAL TIME: AS DIRECTED BY OFFICE    DAY OF SURGERY TAKE ONLY THESE MEDICATIONS UNLESS OTHERWISE INSTRUCTED BY YOUR PHYSICIAN: 0          MANAGING PAIN AFTER SURGERY    We know you are probably wondering what your pain will be like after surgery.  Following surgery it is unrealistic to expect you will not have pain.   Pain is how our bodies let us know that something is wrong or cautions us to be careful.  That said, our goal is to make your pain tolerable.    Methods we may use to treat your pain include (oral or IV medications, PCAs, epidurals, nerve blocks, etc.)   While some procedures require IV pain medications for a short time after surgery, transitioning to pain medications by mouth allows for better management of pain.   Your nurse will encourage you to take oral pain medications whenever possible.  IV medications work almost immediately, but only last a short while.  Taking medications by mouth allows for a more constant level of medication in your blood stream for a longer period of time.      Once your pain is out of control it is harder to get back under control.  It is important you are aware when your next dose of pain medication is due.  If you are admitted, your nurse may write the time of your next dose on the white board in your room to help you remember.      We are interested in your pain and encourage you to inform us about aggravating factors during your visit.   Many times a simple repositioning every few hours can make a big difference.    If your physician says it is okay, do not let your pain prevent you from getting out of bed. Be sure to call your nurse for assistance prior to getting up so you do not fall.      Before surgery, please decide your tolerable pain goal.  These faces help describe the pain ratings we use on a 0-10  scale.   Be prepared to tell us your goal and whether or not you take pain or anxiety medications at home.            BEFORE YOU COME TO THE HOSPITAL  (Pre-op instructions)  • Do not eat, drink, smoke or chew gum after midnight the night before surgery.  This also includes no mints.  • Morning of surgery take only the medicines you have been instructed with a sip of water unless otherwise instructed  by your physician.  • Do not shave, wear makeup or dark nail polish.  • Remove all jewelry including rings.  • Leave anything you consider valuable at home.  • Leave your suitcase in the car until after your surgery.  • Bring the following with you if applicable:  o Picture ID and insurance, Medicare or Medicaid cards  o Co-pay/deductible required by insurance (cash, check, credit card)  o Copy of advance directive, living will or power-of- documents if not brought to PAT  o CPAP or BIPAP mask and tubing  o Relaxation aids (MP3 player, book, magazine)  • On the day of surgery check in at registration located at the main entrance of the hospital.       Outpatient Surgery Guidelines, Adult  Outpatient procedures are those for which the person having the procedure is allowed to go home the same day as the procedure. Various procedures are done on an outpatient basis. You should follow some general guidelines if you will be having an outpatient procedure.  LET YOUR HEALTH CARE PROVIDER KNOW ABOUT:  · Any allergies you have.  · All medicines you are taking, including vitamins, herbs, eye drops, creams, and over-the-counter medicines.  · Previous problems you or members of your family have had with the use of anesthetics.  · Any blood disorders you have.  · Previous surgeries you have had.  · Medical conditions you have.  RISKS AND COMPLICATIONS  Your health care provider will discuss possible risks and complications with you before surgery. Common risks and complications include:    · Problems due to the use of  anesthetics.  · Blood loss and replacement (does not apply to minor surgical procedures).  · Temporary increase in pain due to surgery.  · Uncorrected pain or problems that the surgery was meant to correct.  · Infection.  · New damage.  BEFORE THE PROCEDURE  · Ask your health care provider about changing or stopping your regular medicines. You may need to stop taking certain medicines in the days or weeks before the procedure.  · Stop smoking at least 2 weeks before surgery. This lowers your risk for complications during and after surgery. Ask your health care provider for help with this if needed.  · Eat your usual meals and a light supper the day before surgery. Continue fluid intake. Do not drink alcohol.  · Do not eat or drink after midnight the night before your surgery.   · Arrange for someone to take you home and to stay with you for 24 hours after the procedure. Medicine given for your procedure may affect your ability to drive or to care for yourself.  · Call your health care provider's office if you develop an illness or problem that may prevent you from safely having your procedure.  AFTER THE PROCEDURE  After surgery, you will be taken to a recovery area, where your progress will be monitored. If there are no complications, you will be allowed to go home when you are awake, stable, and taking fluids well. You may have numbness around the surgical site. Healing will take some time. You will have tenderness at the surgical site and may have some swelling and bruising. You may also have some nausea.  HOME CARE INSTRUCTIONS  · Do not drive for 24 hours, or as directed by your health care provider. Do not drive while taking prescription pain medicines.  · Do not drink alcohol for 24 hours.  · Do not make important decisions or sign legal documents for 24 hours.  · You may resume a normal diet and activities as directed.  · Do not lift anything heavier than 10 pounds (4.5 kg) or play contact sports until your  health care provider says it is okay.  · Change your bandages (dressings) as directed.  · Only take over-the-counter or prescription medicines as directed by your health care provider.  · Follow up with your health care provider as directed.  SEEK MEDICAL CARE IF:  · You have increased bleeding (more than a small spot) from the surgical site.  · You have redness, swelling, or increasing pain in the wound.  · You see pus coming from the wound.  · You have a fever.  · You notice a bad smell coming from the wound or dressing.  · You feel lightheaded or faint.  · You develop a rash.  · You have trouble breathing.  · You develop allergies.  MAKE SURE YOU:  · Understand these instructions.  · Will watch your condition.  · Will get help right away if you are not doing well or get worse.     This information is not intended to replace advice given to you by your health care provider. Make sure you discuss any questions you have with your health care provider.     Document Released: 09/12/2002 Document Revised: 05/03/2016 Document Reviewed: 05/22/2014  [employer] Interactive Patient Education ©2016 [employer]       Fall Prevention in Hospitals, Adult  As a hospital patient, your condition and the treatments you receive can increase your risk for falls. Some additional risk factors for falls in a hospital include:  · Being in an unfamiliar environment.  · Being on bed rest.  · Your surgery.  · Taking certain medicines.  · Your tubing requirements, such as intravenous (IV) therapy or catheters.  It is important that you learn how to decrease fall risks while at the hospital. Below are important tips that can help prevent falls.  SAFETY TIPS FOR PREVENTING FALLS  Talk about your risk of falling.  · Ask your health care provider why you are at risk for falling. Is it your medicine, illness, tubing placement, or something else?  · Make a plan with your health care provider to keep you safe from falls.  · Ask your health care  provider or pharmacist about side effects of your medicines. Some medicines can make you dizzy or affect your coordination.  Ask for help.  · Ask for help before getting out of bed. You may need to press your call button.  · Ask for assistance in getting safely to the toilet.  · Ask for a walker or cane to be put at your bedside. Ask that most of the side rails on your bed be placed up before your health care provider leaves the room.  · Ask family or friends to sit with you.  · Ask for things that are out of your reach, such as your glasses, hearing aids, telephone, bedside table, or call button.  Follow these tips to avoid falling:  · Stay lying or seated, rather than standing, while waiting for help.  · Wear rubber-soled slippers or shoes whenever you walk in the hospital.  · Avoid quick, sudden movements.  ¨ Change positions slowly.  ¨ Sit on the side of your bed before standing.  ¨ Stand up slowly and wait before you start to walk.  · Let your health care provider know if there is a spill on the floor.  · Pay careful attention to the medical equipment, electrical cords, and tubes around you.  · When you need help, use your call button by your bed or in the bathroom. Wait for one of your health care providers to help you.  · If you feel dizzy or unsure of your footing, return to bed and wait for assistance.  · Avoid being distracted by the TV, telephone, or another person in your room.  · Do not lean or support yourself on rolling objects, such as IV poles or bedside tables.     This information is not intended to replace advice given to you by your health care provider. Make sure you discuss any questions you have with your health care provider.     Document Released: 12/15/2001 Document Revised: 01/08/2016 Document Reviewed: 08/25/2013  [employer] Interactive Patient Education ©2016 [employer]       Surgical Site Infections FAQs  What is a Surgical Site Infection (SSI)?  A surgical site infection is an  infection that occurs after surgery in the part of the body where the surgery took place. Most patients who have surgery do not develop an infection. However, infections develop in about 1 to 3 out of every 100 patients who have surgery.  Some of the common symptoms of a surgical site infection are:  · Redness and pain around the area where you had surgery  · Drainage of cloudy fluid from your surgical wound  · Fever  Can SSIs be treated?  Yes. Most surgical site infections can be treated with antibiotics. The antibiotic given to you depends on the bacteria (germs) causing the infection. Sometimes patients with SSIs also need another surgery to treat the infection.  What are some of the things that hospitals are doing to prevent SSIs?  To prevent SSIs, doctors, nurses, and other healthcare providers:  · Clean their hands and arms up to their elbows with an antiseptic agent just before the surgery.  · Clean their hands with soap and water or an alcohol-based hand rub before and after caring for each patient.  · May remove some of your hair immediately before your surgery using electric clippers if the hair is in the same area where the procedure will occur. They should not shave you with a razor.  · Wear special hair covers, masks, gowns, and gloves during surgery to keep the surgery area clean.  · Give you antibiotics before your surgery starts. In most cases, you should get antibiotics within 60 minutes before the surgery starts and the antibiotics should be stopped within 24 hours after surgery.  · Clean the skin at the site of your surgery with a special soap that kills germs.  What can I do to help prevent SSIs?  Before your surgery:  · Tell your doctor about other medical problems you may have. Health problems such as allergies, diabetes, and obesity could affect your surgery and your treatment.  · Quit smoking. Patients who smoke get more infections. Talk to your doctor about how you can quit before your  surgery.  · Do not shave near where you will have surgery. Shaving with a razor can irritate your skin and make it easier to develop an infection.  At the time of your surgery:  · Speak up if someone tries to shave you with a razor before surgery. Ask why you need to be shaved and talk with your surgeon if you have any concerns.  · Ask if you will get antibiotics before surgery.  After your surgery:  · Make sure that your healthcare providers clean their hands before examining you, either with soap and water or an alcohol-based hand rub.  · If you do not see your providers clean their hands, please ask them to do so.  · Family and friends who visit you should not touch the surgical wound or dressings.  · Family and friends should clean their hands with soap and water or an alcohol-based hand rub before and after visiting you. If you do not see them clean their hands, ask them to clean their hands.  What do I need to do when I go home from the hospital?  · Before you go home, your doctor or nurse should explain everything you need to know about taking care of your wound. Make sure you understand how to care for your wound before you leave the hospital.  · Always clean your hands before and after caring for your wound.  · Before you go home, make sure you know who to contact if you have questions or problems after you get home.  · If you have any symptoms of an infection, such as redness and pain at the surgery site, drainage, or fever, call your doctor immediately.  If you have additional questions, please ask your doctor or nurse.  Developed and co-sponsored by The Society for Healthcare Epidemiology of [place] (SHEA); Infectious Diseases Society of [place] (IDSA); American Hospital Association; Association for Professionals in Infection Control and Epidemiology (APIC); Centers for Disease Control and Prevention (CDC); and The Joint Commission.     This information is not intended to replace advice given to you by  your health care provider. Make sure you discuss any questions you have with your health care provider.     Document Released: 12/23/2014 Document Revised: 01/08/2016 Document Reviewed: 03/02/2016  [employer] Interactive Patient Education ©2016 [employer]     PATIENT/FAMILY/RESPONSIBLE PARTY VERBALIZES UNDERSTANDING OF ABOVE EDUCATION.  COPY OF PAIN SCALE GIVEN AND REVIEWED WITH VERBALIZED UNDERSTANDING.

## 2018-05-09 ENCOUNTER — ANESTHESIA (OUTPATIENT)
Dept: PERIOP | Facility: HOSPITAL | Age: 58
End: 2018-05-09

## 2018-05-09 ENCOUNTER — APPOINTMENT (OUTPATIENT)
Dept: GENERAL RADIOLOGY | Facility: HOSPITAL | Age: 58
End: 2018-05-09

## 2018-05-09 ENCOUNTER — ANESTHESIA EVENT (OUTPATIENT)
Dept: PERIOP | Facility: HOSPITAL | Age: 58
End: 2018-05-09

## 2018-05-09 ENCOUNTER — HOSPITAL ENCOUNTER (OUTPATIENT)
Facility: HOSPITAL | Age: 58
Discharge: HOME | End: 2018-05-09
Attending: UROLOGY | Admitting: UROLOGY

## 2018-05-09 VITALS
TEMPERATURE: 97.8 F | SYSTOLIC BLOOD PRESSURE: 125 MMHG | DIASTOLIC BLOOD PRESSURE: 72 MMHG | HEART RATE: 78 BPM | RESPIRATION RATE: 16 BRPM | OXYGEN SATURATION: 99 %

## 2018-05-09 DIAGNOSIS — N20.1 URETERAL STONE: ICD-10-CM

## 2018-05-09 LAB
BACTERIA SPEC AEROBE CULT: ABNORMAL
BACTERIA SPEC AEROBE CULT: ABNORMAL

## 2018-05-09 PROCEDURE — 25010000002 FENTANYL CITRATE (PF) 100 MCG/2ML SOLUTION: Performed by: NURSE ANESTHETIST, CERTIFIED REGISTERED

## 2018-05-09 PROCEDURE — 25010000002 MIDAZOLAM PER 1 MG: Performed by: ANESTHESIOLOGY

## 2018-05-09 PROCEDURE — 25010000002 PROPOFOL 10 MG/ML EMULSION: Performed by: NURSE ANESTHETIST, CERTIFIED REGISTERED

## 2018-05-09 PROCEDURE — 25010000002 ONDANSETRON PER 1 MG: Performed by: NURSE ANESTHETIST, CERTIFIED REGISTERED

## 2018-05-09 PROCEDURE — 50590 FRAGMENTING OF KIDNEY STONE: CPT | Performed by: UROLOGY

## 2018-05-09 PROCEDURE — 25010000002 DEXAMETHASONE PER 1 MG: Performed by: ANESTHESIOLOGY

## 2018-05-09 PROCEDURE — 74018 RADEX ABDOMEN 1 VIEW: CPT

## 2018-05-09 PROCEDURE — 25010000002 KETOROLAC TROMETHAMINE PER 15 MG: Performed by: NURSE ANESTHETIST, CERTIFIED REGISTERED

## 2018-05-09 RX ORDER — SODIUM CHLORIDE 0.9 % (FLUSH) 0.9 %
1-10 SYRINGE (ML) INJECTION AS NEEDED
Status: DISCONTINUED | OUTPATIENT
Start: 2018-05-09 | End: 2018-05-09 | Stop reason: HOSPADM

## 2018-05-09 RX ORDER — IBUPROFEN 600 MG/1
600 TABLET ORAL ONCE AS NEEDED
Status: DISCONTINUED | OUTPATIENT
Start: 2018-05-09 | End: 2018-05-09 | Stop reason: HOSPADM

## 2018-05-09 RX ORDER — ONDANSETRON 2 MG/ML
INJECTION INTRAMUSCULAR; INTRAVENOUS AS NEEDED
Status: DISCONTINUED | OUTPATIENT
Start: 2018-05-09 | End: 2018-05-09 | Stop reason: SURG

## 2018-05-09 RX ORDER — LIDOCAINE HYDROCHLORIDE 20 MG/ML
INJECTION, SOLUTION INFILTRATION; PERINEURAL AS NEEDED
Status: DISCONTINUED | OUTPATIENT
Start: 2018-05-09 | End: 2018-05-09 | Stop reason: SURG

## 2018-05-09 RX ORDER — LABETALOL HYDROCHLORIDE 5 MG/ML
5 INJECTION, SOLUTION INTRAVENOUS
Status: DISCONTINUED | OUTPATIENT
Start: 2018-05-09 | End: 2018-05-09 | Stop reason: HOSPADM

## 2018-05-09 RX ORDER — SODIUM CHLORIDE 0.9 % (FLUSH) 0.9 %
3 SYRINGE (ML) INJECTION AS NEEDED
Status: DISCONTINUED | OUTPATIENT
Start: 2018-05-09 | End: 2018-05-09 | Stop reason: HOSPADM

## 2018-05-09 RX ORDER — DEXAMETHASONE SODIUM PHOSPHATE 4 MG/ML
4 INJECTION, SOLUTION INTRA-ARTICULAR; INTRALESIONAL; INTRAMUSCULAR; INTRAVENOUS; SOFT TISSUE ONCE AS NEEDED
Status: COMPLETED | OUTPATIENT
Start: 2018-05-09 | End: 2018-05-09

## 2018-05-09 RX ORDER — ACETAMINOPHEN 500 MG
1000 TABLET ORAL ONCE
Status: COMPLETED | OUTPATIENT
Start: 2018-05-09 | End: 2018-05-09

## 2018-05-09 RX ORDER — MIDAZOLAM HYDROCHLORIDE 1 MG/ML
2 INJECTION INTRAMUSCULAR; INTRAVENOUS
Status: DISCONTINUED | OUTPATIENT
Start: 2018-05-09 | End: 2018-05-09 | Stop reason: HOSPADM

## 2018-05-09 RX ORDER — SODIUM CHLORIDE, SODIUM LACTATE, POTASSIUM CHLORIDE, CALCIUM CHLORIDE 600; 310; 30; 20 MG/100ML; MG/100ML; MG/100ML; MG/100ML
1000 INJECTION, SOLUTION INTRAVENOUS CONTINUOUS
Status: DISCONTINUED | OUTPATIENT
Start: 2018-05-09 | End: 2018-05-09 | Stop reason: HOSPADM

## 2018-05-09 RX ORDER — PROPOFOL 10 MG/ML
VIAL (ML) INTRAVENOUS AS NEEDED
Status: DISCONTINUED | OUTPATIENT
Start: 2018-05-09 | End: 2018-05-09 | Stop reason: SURG

## 2018-05-09 RX ORDER — IPRATROPIUM BROMIDE AND ALBUTEROL SULFATE 2.5; .5 MG/3ML; MG/3ML
3 SOLUTION RESPIRATORY (INHALATION) ONCE AS NEEDED
Status: DISCONTINUED | OUTPATIENT
Start: 2018-05-09 | End: 2018-05-09 | Stop reason: HOSPADM

## 2018-05-09 RX ORDER — SODIUM CHLORIDE, SODIUM LACTATE, POTASSIUM CHLORIDE, CALCIUM CHLORIDE 600; 310; 30; 20 MG/100ML; MG/100ML; MG/100ML; MG/100ML
100 INJECTION, SOLUTION INTRAVENOUS CONTINUOUS
Status: DISCONTINUED | OUTPATIENT
Start: 2018-05-09 | End: 2018-05-09 | Stop reason: HOSPADM

## 2018-05-09 RX ORDER — ONDANSETRON 2 MG/ML
4 INJECTION INTRAMUSCULAR; INTRAVENOUS ONCE AS NEEDED
Status: DISCONTINUED | OUTPATIENT
Start: 2018-05-09 | End: 2018-05-09 | Stop reason: HOSPADM

## 2018-05-09 RX ORDER — KETOROLAC TROMETHAMINE 30 MG/ML
INJECTION, SOLUTION INTRAMUSCULAR; INTRAVENOUS AS NEEDED
Status: DISCONTINUED | OUTPATIENT
Start: 2018-05-09 | End: 2018-05-09 | Stop reason: SURG

## 2018-05-09 RX ORDER — OXYCODONE AND ACETAMINOPHEN 10; 325 MG/1; MG/1
1 TABLET ORAL ONCE AS NEEDED
Status: DISCONTINUED | OUTPATIENT
Start: 2018-05-09 | End: 2018-05-09 | Stop reason: HOSPADM

## 2018-05-09 RX ORDER — NALOXONE HCL 0.4 MG/ML
0.4 VIAL (ML) INJECTION AS NEEDED
Status: DISCONTINUED | OUTPATIENT
Start: 2018-05-09 | End: 2018-05-09 | Stop reason: HOSPADM

## 2018-05-09 RX ORDER — METOCLOPRAMIDE HYDROCHLORIDE 5 MG/ML
5 INJECTION INTRAMUSCULAR; INTRAVENOUS
Status: DISCONTINUED | OUTPATIENT
Start: 2018-05-09 | End: 2018-05-09 | Stop reason: HOSPADM

## 2018-05-09 RX ORDER — MIDAZOLAM HYDROCHLORIDE 1 MG/ML
1 INJECTION INTRAMUSCULAR; INTRAVENOUS
Status: DISCONTINUED | OUTPATIENT
Start: 2018-05-09 | End: 2018-05-09 | Stop reason: HOSPADM

## 2018-05-09 RX ORDER — FENTANYL CITRATE 50 UG/ML
25 INJECTION, SOLUTION INTRAMUSCULAR; INTRAVENOUS AS NEEDED
Status: DISCONTINUED | OUTPATIENT
Start: 2018-05-09 | End: 2018-05-09 | Stop reason: HOSPADM

## 2018-05-09 RX ORDER — OXYCODONE AND ACETAMINOPHEN 7.5; 325 MG/1; MG/1
2 TABLET ORAL ONCE AS NEEDED
Status: DISCONTINUED | OUTPATIENT
Start: 2018-05-09 | End: 2018-05-09 | Stop reason: HOSPADM

## 2018-05-09 RX ORDER — FENTANYL CITRATE 50 UG/ML
INJECTION, SOLUTION INTRAMUSCULAR; INTRAVENOUS AS NEEDED
Status: DISCONTINUED | OUTPATIENT
Start: 2018-05-09 | End: 2018-05-09 | Stop reason: SURG

## 2018-05-09 RX ORDER — MEPERIDINE HYDROCHLORIDE 50 MG/ML
12.5 INJECTION INTRAMUSCULAR; INTRAVENOUS; SUBCUTANEOUS
Status: DISCONTINUED | OUTPATIENT
Start: 2018-05-09 | End: 2018-05-09 | Stop reason: HOSPADM

## 2018-05-09 RX ADMIN — FENTANYL CITRATE 25 MCG: 50 INJECTION, SOLUTION INTRAMUSCULAR; INTRAVENOUS at 14:31

## 2018-05-09 RX ADMIN — PROPOFOL 100 MG: 10 INJECTION, EMULSION INTRAVENOUS at 13:22

## 2018-05-09 RX ADMIN — DEXAMETHASONE SODIUM PHOSPHATE 4 MG: 4 INJECTION, SOLUTION INTRA-ARTICULAR; INTRALESIONAL; INTRAMUSCULAR; INTRAVENOUS; SOFT TISSUE at 12:46

## 2018-05-09 RX ADMIN — ONDANSETRON HYDROCHLORIDE 4 MG: 2 SOLUTION INTRAMUSCULAR; INTRAVENOUS at 13:39

## 2018-05-09 RX ADMIN — KETOROLAC TROMETHAMINE 30 MG: 30 INJECTION, SOLUTION INTRAMUSCULAR at 13:38

## 2018-05-09 RX ADMIN — Medication 2 G: at 13:21

## 2018-05-09 RX ADMIN — ACETAMINOPHEN 1000 MG: 500 TABLET ORAL at 12:46

## 2018-05-09 RX ADMIN — FENTANYL CITRATE 25 MCG: 50 INJECTION, SOLUTION INTRAMUSCULAR; INTRAVENOUS at 13:47

## 2018-05-09 RX ADMIN — SODIUM CHLORIDE, POTASSIUM CHLORIDE, SODIUM LACTATE AND CALCIUM CHLORIDE 1000 ML: 600; 310; 30; 20 INJECTION, SOLUTION INTRAVENOUS at 11:28

## 2018-05-09 RX ADMIN — FENTANYL CITRATE 50 MCG: 50 INJECTION, SOLUTION INTRAMUSCULAR; INTRAVENOUS at 13:21

## 2018-05-09 RX ADMIN — LIDOCAINE HYDROCHLORIDE 100 MG: 20 INJECTION, SOLUTION INFILTRATION; PERINEURAL at 13:21

## 2018-05-09 RX ADMIN — MIDAZOLAM HYDROCHLORIDE 2 MG: 1 INJECTION, SOLUTION INTRAMUSCULAR; INTRAVENOUS at 12:47

## 2018-05-09 RX ADMIN — PROPOFOL 200 MG: 10 INJECTION, EMULSION INTRAVENOUS at 13:21

## 2018-05-09 RX ADMIN — LIDOCAINE HYDROCHLORIDE 0.5 ML: 10 INJECTION, SOLUTION EPIDURAL; INFILTRATION; INTRACAUDAL; PERINEURAL at 11:24

## 2018-05-09 RX ADMIN — Medication 2 G: at 13:27

## 2018-05-09 NOTE — OP NOTE
OP NOTE  Andres Delgado    Date of Procedure: 5/9/2018    Pre-op Diagnosis:   Ureteral stone [N20.1]    Post-op Diagnosis:     Post-Op Diagnosis Codes:     * Ureteral stone [N20.1]    Procedure/CPT® Codes:      Procedure(s):  EXTRACORPOREAL SHOCKWAVE LITHOTRIPSY    Surgeon(s):  Yash Matos MD    Anesthesia: General    Staff:   Circulator: Karlie Todd RN; Shalonda Pham RN  Vendor Representative: Zheng Corrales    Indications: Patient has  A stone  measuring approximately 7 mm in the left Mid ureter.  After discussion of options, patient has given informed consent to undergo left ESWL.  All risks, benefits, and alternatives were discussed.    Operative Report: The patient is identified. The KUB is reviewed. After answering any questions, patient was brought to the operating room and placed on the patient table of the [facility].  General endotracheal anesthesia was administered.  Preoperative KUB was reviewed.   An [facility] c-arm fluoroscope was used to identify the stone.    The shock-head is brought into postion.  This stone is brought into the F2 plane for focus.  Treatment was initiated.  We gradually increased the energy level from 1 to 9.  This stone was treated at a rate of 60.  We paused for 2 minutes after 300 shocks to reduce risk of renal damage.  The stone was periodically checked to make sure that we were on it and getting good breakup.  We checked at 700, 1000, 1500, 2000, and 2500 shocks.  The stone did have good breakup.  I felt it was unnecessary to place a ureteral stent.  At the conclusion of 4000 shocks, the patient was awakened from anesthesia and transferred to the recovery room in satisfactory condition.      Under fluoroscopy the stone appeared to contract up and got a little sphincter on this but still you could see the stone present in that location however again it did appear to crack up but just had moved a lot and certainly did not disappear    Estimated Blood Loss:   0    Specimens:                None      Drains:      Complications: none  Plan: Routine follow-up    Yash Matos MD     Date: 5/9/2018  Time: 2:52 PM

## 2018-05-09 NOTE — ANESTHESIA PROCEDURE NOTES
Airway    Indications and Patient Condition    Preoxygenated: yes  Mask difficulty assessment: 0 - not attempted    Final Airway Details  Final airway type: supraglottic airway      Successful airway: I-gel  Size 5    Number of attempts at approach: 1

## 2018-05-09 NOTE — H&P (VIEW-ONLY)
Subjective    Mr. Delgado is 57 y.o. male    CHIEF COMPLAINT: Flank pain and ureteral stone    The patient comes back today he has noticed a rather increasing pain again the last several days he has little bit better today but again some nausea and vomiting left flank pain etc. no gross hematuria no fever associated with this his urinalysis today is clear.    The KUB shows stone may have moved a little bit but just maybe L3 4 and L4 5 so still pretty much in the mid upper ureter    KUB independent review    A KUB is available for me to review today.  The image is inspected for a bowel gas pattern and the general bone structure of the spine and pelvis. The kidneys are then inspected closely.  Renal outline is noted if identifiable. The kidney, collecting system, and anticipated path of the ureter are examined for calcifications including those in the true pelvis.  This film reveals:    On the right there are no calcificaitons seen in the kidney or the expected course of the ureter. .    On the left there is a single mid-ureteral stone measuring 7 mm.      History of Present Illness      The following portions of the patient's history were reviewed and updated as appropriate: allergies, current medications, past family history, past medical history, past social history, past surgical history and problem list.    Review of Systems   Constitutional: Negative for chills and fever.   Gastrointestinal: Negative for abdominal pain, anal bleeding and blood in stool.   Genitourinary: Positive for flank pain (left). Negative for frequency, hematuria and urgency.         Current Outpatient Prescriptions:   •  tamsulosin (FLOMAX) 0.4 MG capsule 24 hr capsule, Take 1 capsule by mouth Daily., Disp: 30 capsule, Rfl: 5  •  HYDROcodone-acetaminophen (NORCO) 5-325 MG per tablet, Take 1 tablet by mouth Every 6 (Six) Hours As Needed., Disp: , Rfl:   •  sulfamethoxazole-trimethoprim (BACTRIM DS,SEPTRA DS) 800-160 MG per tablet, Take 1  "tablet by mouth 2 (Two) Times a Day., Disp: , Rfl:     Past Medical History:   Diagnosis Date   • Hypertension    • Kidney stone        History reviewed. No pertinent surgical history.    Social History     Social History   • Marital status: Single     Social History Main Topics   • Smoking status: Former Smoker   • Smokeless tobacco: Never Used   • Alcohol use Yes   • Drug use: Unknown     Other Topics Concern   • Not on file       Family History   Problem Relation Age of Onset   • Kidney disease Father    • No Known Problems Mother        Objective    /72   Temp 96.7 °F (35.9 °C)   Ht 182.9 cm (72\")   Wt 112 kg (246 lb 12.8 oz)   BMI 33.47 kg/m²     Physical Exam      Office Visit on 04/19/2018   Component Date Value Ref Range Status   • Color 04/19/2018 Yellow  Yellow, Straw, Dark Yellow, Mitali Final   • Clarity, UA 04/19/2018 Clear  Clear Final   • Glucose, UA 04/19/2018 Negative  Negative, 1000 mg/dL (3+) mg/dL Final   • Bilirubin 04/19/2018 Negative  Negative Final   • Ketones, UA 04/19/2018 Negative  Negative Final   • Specific Gravity  04/19/2018 1.025  1.005 - 1.030 Final   • Blood, UA 04/19/2018 Trace* Negative Final   • pH, Urine 04/19/2018 5.5  5.0 - 8.0 Final   • Protein, POC 04/19/2018 Negative  Negative mg/dL Final   • Urobilinogen, UA 04/19/2018 Normal  Normal Final   • Leukocytes 04/19/2018 Moderate (2+)* Negative Final   • Nitrite, UA 04/19/2018 Negative  Negative Final       Results for orders placed or performed in visit on 05/03/18   POC Urinalysis Dipstick, Automated   Result Value Ref Range    Color Yellow Yellow, Straw, Dark Yellow, Mitali    Clarity, UA Clear Clear    Glucose, UA Negative Negative, 1000 mg/dL (3+) mg/dL    Bilirubin Negative Negative    Ketones, UA Negative Negative    Specific Gravity  1.025 1.005 - 1.030    Blood, UA Small (A) Negative    pH, Urine 5.5 5.0 - 8.0    Protein,  mg/dL (A) Negative mg/dL    Urobilinogen, UA Normal Normal    Leukocytes Negative " Negative    Nitrite, UA Negative Negative     Microscopic Urinalysis  I inspected the urine myself based on the clinical situation including the dipstick urine. The urine is spun in a centrifuge for three minutes. The spun urine shows 7-12 rbc/hpf, none wbc/hpf, none epi/hpf, negative bacteria, negative crystals, and negative casts.     Assessment and Plan    Andres was seen today for flank pain.    Diagnoses and all orders for this visit:    Renal stone  -     POC Urinalysis Dipstick, Automated    Ureteral stone    Plan--I discussed the options with the patient length I think he is anxious to ready now to go ahead and proceed to I discussed ESWL with him I think he would be a good candidate risk complications discussed and all questions were answered and we will do this next week has any problems pain etc. prior then he will call    The patient requested a refill of his pain medication went ahead running prescription for 15 of the Norco 5/325 we will check a [place]

## 2018-05-09 NOTE — ANESTHESIA POSTPROCEDURE EVALUATION
Patient: Andres Delgado    Procedure Summary     Date:  05/09/18 Room / Location:   [facility] /  [facility]    Anesthesia Start:  1318 Anesthesia Stop:  1504    Procedure:  EXTRACORPOREAL SHOCKWAVE LITHOTRIPSY (Left ) Diagnosis:       Ureteral stone      (Ureteral stone [N20.1])    Surgeon:  Yash Matos MD Provider:  Ana Perez CRNA    Anesthesia Type:  general ASA Status:  2          Anesthesia Type: general  Last vitals  BP   132/77 (05/09/18 1530)   Temp   97.8 °F (36.6 °C) (05/09/18 1508)   Pulse   77 (05/09/18 1530)   Resp   16 (05/09/18 1530)     SpO2   96 % (05/09/18 1530)     Post Anesthesia Care and Evaluation    Patient location during evaluation: PACU  Patient participation: complete - patient participated  Level of consciousness: awake and alert  Pain management: adequate  Airway patency: patent  Anesthetic complications: No anesthetic complications    Cardiovascular status: acceptable  Respiratory status: acceptable  Hydration status: acceptable    Comments: Blood pressure 132/77, pulse 77, temperature 97.8 °F (36.6 °C), resp. rate 16, SpO2 96 %.    Pt discharged from PACU based on [place] score >8

## 2018-05-09 NOTE — DISCHARGE INSTRUCTIONS

## 2018-05-09 NOTE — ANESTHESIA PREPROCEDURE EVALUATION
Anesthesia Evaluation     Patient summary reviewed   no history of anesthetic complications:  NPO Solid Status: > 8 hours  NPO Liquid Status: > 8 hours           Airway   Mallampati: II  TM distance: >3 FB  Neck ROM: full  Dental - normal exam     Comment: Multiple permanent crowns    Pulmonary - normal exam    breath sounds clear to auscultation  (+) a smoker (quit cigarettes one year ago, claims an occasional cigar) Former,   (-) asthma, recent URI, sleep apnea  Cardiovascular - normal exam  Exercise tolerance: good (4-7 METS)    ECG reviewed  Rhythm: regular  Rate: normal    (-) pacemaker, hypertension (Denied by patient), past MI, angina, cardiac stents      Neuro/Psych  (-) seizures, TIA, CVA  GI/Hepatic/Renal/Endo    (+) obesity,     (-) GERD, liver disease, no renal disease, diabetes, hypothyroidism, hyperthyroidism    Musculoskeletal     Abdominal    Substance History      OB/GYN          Other                      Anesthesia Plan    ASA 2     general     intravenous induction   Anesthetic plan and risks discussed with patient.

## 2018-05-22 ENCOUNTER — HOSPITAL ENCOUNTER (OUTPATIENT)
Dept: GENERAL RADIOLOGY | Facility: HOSPITAL | Age: 58
Discharge: HOME | End: 2018-05-22
Attending: UROLOGY | Admitting: UROLOGY

## 2018-05-22 ENCOUNTER — OFFICE VISIT (OUTPATIENT)
Dept: UROLOGY | Facility: CLINIC | Age: 58
End: 2018-05-22

## 2018-05-22 VITALS
BODY MASS INDEX: 33.56 KG/M2 | DIASTOLIC BLOOD PRESSURE: 78 MMHG | SYSTOLIC BLOOD PRESSURE: 160 MMHG | WEIGHT: 247.8 LBS | TEMPERATURE: 97.3 F | HEIGHT: 72 IN

## 2018-05-22 DIAGNOSIS — N20.0 RENAL STONE: Primary | ICD-10-CM

## 2018-05-22 DIAGNOSIS — N20.1 URETERAL STONE: ICD-10-CM

## 2018-05-22 LAB
BILIRUB BLD-MCNC: NEGATIVE MG/DL
CLARITY, POC: CLEAR
COLOR UR: YELLOW
GLUCOSE UR STRIP-MCNC: NEGATIVE MG/DL
KETONES UR QL: NEGATIVE
LEUKOCYTE EST, POC: ABNORMAL
NITRITE UR-MCNC: NEGATIVE MG/ML
PH UR: 5 [PH] (ref 5–8)
PROT UR STRIP-MCNC: NEGATIVE MG/DL
RBC # UR STRIP: NEGATIVE /UL
SP GR UR: 1.02 (ref 1–1.03)
UROBILINOGEN UR QL: NORMAL

## 2018-05-22 PROCEDURE — 81001 URINALYSIS AUTO W/SCOPE: CPT | Performed by: UROLOGY

## 2018-05-22 PROCEDURE — 74018 RADEX ABDOMEN 1 VIEW: CPT

## 2018-05-22 PROCEDURE — 99024 POSTOP FOLLOW-UP VISIT: CPT | Performed by: UROLOGY

## 2018-05-22 NOTE — PATIENT INSTRUCTIONS

## 2018-05-22 NOTE — PROGRESS NOTES
Subjective    Mr. Delgado is 57 y.o. male    CHIEF COMPLAINT: Ureteral stone postop ESWL    The patient is doing well he is been a totally asymptomatic he is may be passed a couple little fragments oh bit of blood but that is been it but is not had any pain in several whatsoever.    His KUB shows a fragment is now just above the UVJ but hopefully this is small and passable    KUB independent review    A KUB is available for me to review today.  The image is inspected for a bowel gas pattern and the general bone structure of the spine and pelvis. The kidneys are then inspected closely.  Renal outline is noted if identifiable. The kidney, collecting system, and anticipated path of the ureter are examined for calcifications including those in the true pelvis.  This film reveals:    On the right there are no calcificaitons seen in the kidney or the expected course of the ureter. .    On the left there is a single distal ureteral stone measuring 4 mm.      History of Present Illness      The following portions of the patient's history were reviewed and updated as appropriate: allergies, current medications, past family history, past medical history, past social history, past surgical history and problem list.    Review of Systems   Constitutional: Negative for chills and fever.   Gastrointestinal: Negative for abdominal pain, anal bleeding and blood in stool.   Genitourinary: Negative for flank pain, frequency, hematuria and urgency.         Current Outpatient Prescriptions:   •  tamsulosin (FLOMAX) 0.4 MG capsule 24 hr capsule, Take 1 capsule by mouth Daily., Disp: 30 capsule, Rfl: 5  •  HYDROcodone-acetaminophen (NORCO) 5-325 MG per tablet, Take 1 tablet by mouth Every 6 (Six) Hours As Needed., Disp: , Rfl:     Past Medical History:   Diagnosis Date   • Hypertension    • Kidney stone        Past Surgical History:   Procedure Laterality Date   • EXTRACORPOREAL SHOCK WAVE LITHOTRIPSY (ESWL) Left 5/9/2018    Procedure:  "EXTRACORPOREAL SHOCKWAVE LITHOTRIPSY;  Surgeon: Yash Matos MD;  Location: [facility] OR;  Service: Urology       Social History     Social History   • Marital status: Single     Social History Main Topics   • Smoking status: Former Smoker   • Smokeless tobacco: Never Used   • Alcohol use Yes      Comment: A FEW PER WEEK,OCASSIONALY   • Drug use: No     Other Topics Concern   • Not on file       Family History   Problem Relation Age of Onset   • Kidney disease Father    • No Known Problems Mother        Objective    /78   Temp 97.3 °F (36.3 °C)   Ht 182.9 cm (72\")   Wt 112 kg (247 lb 12.8 oz)   BMI 33.61 kg/m²     Physical Exam      Appointment on 05/07/2018   Component Date Value Ref Range Status   • WBC 05/07/2018 12.02* 4.80 - 10.80 10*3/mm3 Final   • RBC 05/07/2018 3.99* 4.80 - 5.90 10*6/mm3 Final   • Hemoglobin 05/07/2018 11.9* 14.0 - 18.0 g/dL Final   • Hematocrit 05/07/2018 35.9* 40.0 - 52.0 % Final   • MCV 05/07/2018 90.0  82.0 - 95.0 fL Final   • MCH 05/07/2018 29.8  28.0 - 32.0 pg Final   • MCHC 05/07/2018 33.1  33.0 - 36.0 g/dL Final   • RDW 05/07/2018 12.5  12.0 - 15.0 % Final   • RDW-SD 05/07/2018 40.8  40.0 - 54.0 fl Final   • MPV 05/07/2018 9.3  6.0 - 12.0 fL Final   • Platelets 05/07/2018 338  130 - 400 10*3/mm3 Final   • Glucose 05/07/2018 124* 70 - 100 mg/dL Final   • BUN 05/07/2018 18  5 - 21 mg/dL Final   • Creatinine 05/07/2018 1.24  0.50 - 1.40 mg/dL Final   • Sodium 05/07/2018 136  135 - 145 mmol/L Final   • Potassium 05/07/2018 4.0  3.5 - 5.3 mmol/L Final   • Chloride 05/07/2018 97* 98 - 110 mmol/L Final   • CO2 05/07/2018 31.0  24.0 - 31.0 mmol/L Final   • Calcium 05/07/2018 9.4  8.4 - 10.4 mg/dL Final   • eGFR Non African Amer 05/07/2018 60* >60 mL/min/1.73 Final   • BUN/Creatinine Ratio 05/07/2018 14.5  7.0 - 25.0 Final   • Anion Gap 05/07/2018 8.0  4.0 - 13.0 mmol/L Final   • Color, UA 05/07/2018 Yellow  Yellow, Straw Final   • Appearance, UA 05/07/2018 Clear  Clear Final   • " pH, UA 05/07/2018 5.5  5.0 - 8.0 Final   • Specific Gravity, UA 05/07/2018 1.026  1.005 - 1.030 Final   • Glucose, UA 05/07/2018 Negative  Negative Final   • Ketones, UA 05/07/2018 Negative  Negative Final   • Bilirubin, UA 05/07/2018 Negative  Negative Final   • Blood, UA 05/07/2018 Trace* Negative Final   • Protein, UA 05/07/2018 Trace* Negative Final   • Leuk Esterase, UA 05/07/2018 Moderate (2+)* Negative Final   • Nitrite, UA 05/07/2018 Negative  Negative Final   • Urobilinogen, UA 05/07/2018 0.2 E.U./dL  0.2 - 1.0 E.U./dL Final   • Urine Culture 05/07/2018 20,000-30,000 CFU/mL Escherichia coli*  Final   • RBC, UA 05/07/2018 6-12* None Seen /HPF Final   • WBC, UA 05/07/2018 31-50* None Seen /HPF Final   • Bacteria, UA 05/07/2018 Trace* None Seen /HPF Final   • Squamous Epithelial Cells, UA 05/07/2018 0-2  None Seen, 0-2 /HPF Final   • Hyaline Casts, UA 05/07/2018 None Seen  None Seen /LPF Final   • Methodology 05/07/2018 Manual Light Microscopy   Final       Results for orders placed or performed in visit on 05/22/18   POC Urinalysis Dipstick, Automated   Result Value Ref Range    Color Yellow Yellow, Straw, Dark Yellow, Mitali    Clarity, UA Clear Clear    Glucose, UA Negative Negative, 1000 mg/dL (3+) mg/dL    Bilirubin Negative Negative    Ketones, UA Negative Negative    Specific Gravity  1.025 1.005 - 1.030    Blood, UA Negative Negative    pH, Urine 5.0 5.0 - 8.0    Protein, POC Negative Negative mg/dL    Urobilinogen, UA Normal Normal    Leukocytes Trace (A) Negative    Nitrite, UA Negative Negative       Assessment and Plan    Diagnoses and all orders for this visit:    Renal stone  -     POC Urinalysis Dipstick, Automated    Ureteral stone  -     XR Abdomen KUB; Future    Plan--I discussed the findings with him on one to seen back again in a month with a KUB research having severe pain etc. he will let me know he will get his Flomax refilled

## 2022-04-04 ENCOUNTER — OFFICE VISIT (OUTPATIENT)
Dept: PRIMARY CARE CLINIC | Age: 62
End: 2022-04-04

## 2022-04-04 VITALS
HEIGHT: 72 IN | SYSTOLIC BLOOD PRESSURE: 158 MMHG | WEIGHT: 256 LBS | TEMPERATURE: 97.8 F | DIASTOLIC BLOOD PRESSURE: 100 MMHG | BODY MASS INDEX: 34.67 KG/M2 | HEART RATE: 100 BPM | OXYGEN SATURATION: 98 %

## 2022-04-04 DIAGNOSIS — I10 HYPERTENSION, UNSPECIFIED TYPE: ICD-10-CM

## 2022-04-04 DIAGNOSIS — N50.89 TESTICULAR SWELLING, LEFT: Primary | ICD-10-CM

## 2022-04-04 LAB
ALBUMIN SERPL-MCNC: 4.4 G/DL (ref 3.5–5.2)
ALP BLD-CCNC: 58 U/L (ref 40–130)
ALT SERPL-CCNC: 19 U/L (ref 5–41)
ANION GAP SERPL CALCULATED.3IONS-SCNC: 12 MMOL/L (ref 7–19)
AST SERPL-CCNC: 20 U/L (ref 5–40)
BILIRUB SERPL-MCNC: <0.2 MG/DL (ref 0.2–1.2)
BUN BLDV-MCNC: 14 MG/DL (ref 8–23)
CALCIUM SERPL-MCNC: 9.8 MG/DL (ref 8.8–10.2)
CHLORIDE BLD-SCNC: 99 MMOL/L (ref 98–111)
CO2: 28 MMOL/L (ref 22–29)
CREAT SERPL-MCNC: 1 MG/DL (ref 0.5–1.2)
GFR AFRICAN AMERICAN: >59
GFR NON-AFRICAN AMERICAN: >60
GLUCOSE BLD-MCNC: 102 MG/DL (ref 74–109)
POTASSIUM SERPL-SCNC: 5.1 MMOL/L (ref 3.5–5)
SODIUM BLD-SCNC: 139 MMOL/L (ref 136–145)
TOTAL PROTEIN: 6.9 G/DL (ref 6.6–8.7)
TSH REFLEX FT4: 2.53 UIU/ML (ref 0.35–5.5)

## 2022-04-04 PROCEDURE — 99204 OFFICE O/P NEW MOD 45 MIN: CPT | Performed by: NURSE PRACTITIONER

## 2022-04-04 PROCEDURE — 36415 COLL VENOUS BLD VENIPUNCTURE: CPT | Performed by: NURSE PRACTITIONER

## 2022-04-04 RX ORDER — DICLOFENAC SODIUM 75 MG/1
75 TABLET, DELAYED RELEASE ORAL 2 TIMES DAILY
COMMUNITY
Start: 2022-04-02 | End: 2022-05-02

## 2022-04-04 RX ORDER — LISINOPRIL 20 MG/1
20 TABLET ORAL DAILY
Qty: 30 TABLET | Refills: 5 | Status: SHIPPED | OUTPATIENT
Start: 2022-04-04 | End: 2022-05-02 | Stop reason: SDUPTHER

## 2022-04-04 RX ORDER — CIPROFLOXACIN 500 MG/1
500 TABLET, FILM COATED ORAL 2 TIMES DAILY
COMMUNITY
Start: 2022-04-02 | End: 2022-04-10

## 2022-04-04 ASSESSMENT — ENCOUNTER SYMPTOMS
GASTROINTESTINAL NEGATIVE: 1
RESPIRATORY NEGATIVE: 1
ALLERGIC/IMMUNOLOGIC NEGATIVE: 1
EYES NEGATIVE: 1

## 2022-04-04 ASSESSMENT — PATIENT HEALTH QUESTIONNAIRE - PHQ9
SUM OF ALL RESPONSES TO PHQ QUESTIONS 1-9: 0
1. LITTLE INTEREST OR PLEASURE IN DOING THINGS: 0
SUM OF ALL RESPONSES TO PHQ QUESTIONS 1-9: 0
2. FEELING DOWN, DEPRESSED OR HOPELESS: 0
SUM OF ALL RESPONSES TO PHQ9 QUESTIONS 1 & 2: 0
SUM OF ALL RESPONSES TO PHQ QUESTIONS 1-9: 0
SUM OF ALL RESPONSES TO PHQ QUESTIONS 1-9: 0

## 2022-04-04 NOTE — PATIENT INSTRUCTIONS
Patient Education        Learning About ACE Inhibitors  What are ACE inhibitors? ACE (angiotensin-converting enzyme) inhibitors are medicines that lower blood pressure. They stop the release of an enzyme. This enzyme makes your blood vessels smaller. Without it, your blood vessels relax and get bigger. Thislowers your blood pressure. These medicines also increase how much water and salt go into your urine. Thisalso lowers blood pressure. You may take this kind of medicine if you have high blood pressure. Or you maytake it if you have heart problems, kidney problems, or diabetes. If you have coronary artery disease, this medicine can help prevent heartattacks and strokes. Examples   benazepril (Lotensin)   enalapril (Vasotec)   lisinopril (Prinivil, Zestril)   ramipril (Altace)  This is not a complete list.  Possible side effects  Side effects may include:   A cough.  Low blood pressure. This can make you feel dizzy or weak.  Too much potassium in your body.  Swelling of your lips, tongue, or face. If the swelling is severe, you may need treatment right away. Severe swelling can make it hard to breathe, but this is rare. You may have other side effects or reactions not listed here. Check theinformation that comes with your medicine. What to know about taking this medicine   ACE inhibitors can cause a dry cough. Talk to your doctor if you have a dry cough. You may need a different medicine.  These medicines can cause an allergic reaction. This can cause a little swelling. Or it can cause red bumps on your skin that hurt. In rare cases, the swelling may make it hard for you to breathe.  Do not take this medicine if you are pregnant or plan to become pregnant.  Take your medicines exactly as prescribed. Call your doctor if you think you are having a problem with your medicine.  Check with your doctor or pharmacist before you use any other medicines.  This includes over-the-counter medicines. Make sure your doctor knows all of the medicines, vitamins, herbal products, and supplements you take. Taking some medicines together can cause problems.  You may need regular blood tests. Where can you learn more? Go to https://FusionAdseleno.Matisse Networks. org and sign in to your Transcriptic account. Enter P050 in the [facility] box to learn more about \"Learning About ACE Inhibitors. \"     If you do not have an account, please click on the \"Sign Up Now\" link. Current as of: January 10, 2022               Content Version: 13.2  © 2481-8918 [employer]. Care instructions adapted under license by [facility] ([facility]). If you have questions about a medical condition or this instruction, always ask your healthcare professional. [address] any warranty or liability for your use of this information. Patient Education        Learning About High Blood Pressure  What is high blood pressure? Blood pressure is a measure of how hard the blood pushes against the walls of your arteries. It's normal for blood pressure to go up and down throughout the day. But if it stays up, you have high blood pressure. Another name for highblood pressure is hypertension. Two numbers tell you your blood pressure. The first number is the systolic pressure (top number). It shows how hard the blood pushes when your heart is pumping. The second number is the diastolic pressure (bottom number). It shows how hard the blood pushes between heartbeats, when your heart is relaxed andfilling with blood. Your doctor will give you a goal for your blood pressure based on your health and your age. High blood pressure (hypertension) means that the top numberstays high, or the bottom number stays high, or both. High blood pressure increases the risk of stroke, heart attack, and otherproblems. What happens when you have high blood pressure?    Blood flows through your arteries with too much force. Over time, this can damage the heart and the walls of your arteries. But you can't feel it. High blood pressure usually doesn't cause symptoms.  High blood pressure makes your heart work harder. And that can lead to heart failure, which means your heart doesn't pump as much blood as your body needs.  Fat and calcium start to build up in your arteries. This buildup is called hardening of the arteries. It can cause many problems including a heart attack and stroke.  Arteries also carry blood and oxygen to organs like your eyes, kidneys, and brain. If high blood pressure damages those arteries, it can lead to vision loss, kidney disease, stroke, and a higher risk of dementia. How can you prevent high blood pressure?  Stay at a healthy weight.  Try to limit how much sodium you eat to less than 2,300 milligrams (mg) a day. If you limit your sodium to 1,500 mg a day, you can lower your blood pressure even more. ? Buy foods that are labeled \"unsalted,\" \"sodium-free,\" or \"low-sodium. \" Foods labeled \"reduced-sodium\" and \"light sodium\" may still have too much sodium. ? Flavor your food with garlic, lemon juice, onion, vinegar, herbs, and spices instead of salt. Do not use soy sauce, steak sauce, onion salt, garlic salt, mustard, or ketchup on your food. ? Use less salt (or none) when recipes call for it. You can often use half the salt a recipe calls for without losing flavor.  Be physically active. Get at least 30 minutes of exercise on most days of the week. Walking is a good choice. You also may want to do other activities, such as running, swimming, cycling, or playing tennis or team sports.  Limit alcohol to 2 drinks a day for men and 1 drink a day for women.  Eat plenty of fruits, vegetables, and low-fat dairy products. Eat less saturated and total fats. How is high blood pressure treated?  Your doctor will suggest making lifestyle changes to help your heart.  For example, your doctor may ask you to eat healthy foods, quit smoking, lose extra weight, and be more active.  If lifestyle changes don't help enough, your doctor may recommend that you take medicine.  When blood pressure is very high, medicines are needed to lower it. Follow-up care is a key part of your treatment and safety. Be sure to make and go to all appointments, and call your doctor if you are having problems. It's also a good idea to know your test results and keep alist of the medicines you take. Where can you learn more? Go to https://GuestmobpeDatto.Claim Maps. org and sign in to your Streamix account. Enter P501 in the [employer] box to learn more about \"Learning About High Blood Pressure. \"     If you do not have an account, please click on the \"Sign Up Now\" link. Current as of: January 10, 2022               Content Version: 13.2  © 2006-2022 [employer]. Care instructions adapted under license by [facility] ([facility]). If you have questions about a medical condition or this instruction, always ask your healthcare professional. [address] any warranty or liability for your use of this information. Patient Education        DASH Diet: Care Instructions  Your Care Instructions     The DASH diet is an eating plan that can help lower your blood pressure. DASH stands for Dietary Approaches to Stop Hypertension. Hypertension is high bloodpressure. The DASH diet focuses on eating foods that are high in calcium, potassium, and magnesium. These nutrients can lower blood pressure. The foods that are highest in these nutrients are fruits, vegetables, low-fat dairy products, nuts, seeds, and legumes. But taking calcium, potassium, and magnesium supplements instead of eating foods that are high in those nutrients does not have the same effect. The DASH diet also includes whole grains, fish, and poultry.   The DASH diet is one of several lifestyle changes your doctor may recommend to lower your high blood pressure. Your doctor may also want you to decrease the amount of sodium in your diet. Lowering sodium while following the DASH dietcan lower blood pressure even further than just the DASH diet alone. Follow-up care is a key part of your treatment and safety. Be sure to make and go to all appointments, and call your doctor if you are having problems. It's also a good idea to know your test results and keep alist of the medicines you take. How can you care for yourself at home? Following the DASH diet   Eat 4 to 5 servings of fruit each day. A serving is 1 medium-sized piece of fruit, ½ cup chopped or canned fruit, 1/4 cup dried fruit, or 4 ounces (½ cup) of fruit juice. Choose fruit more often than fruit juice.  Eat 4 to 5 servings of vegetables each day. A serving is 1 cup of lettuce or raw leafy vegetables, ½ cup of chopped or cooked vegetables, or 4 ounces (½ cup) of vegetable juice. Choose vegetables more often than vegetable juice.  Get 2 to 3 servings of low-fat and fat-free dairy each day. A serving is 8 ounces of milk, 1 cup of yogurt, or 1 ½ ounces of cheese.  Eat 6 to 8 servings of grains each day. A serving is 1 slice of bread, 1 ounce of dry cereal, or ½ cup of cooked rice, pasta, or cooked cereal. Try to choose whole-grain products as much as possible.  Limit lean meat, poultry, and fish to 2 servings each day. A serving is 3 ounces, about the size of a deck of cards.  Eat 4 to 5 servings of nuts, seeds, and legumes (cooked dried beans, lentils, and split peas) each week. A serving is 1/3 cup of nuts, 2 tablespoons of seeds, or ½ cup of cooked beans or peas.  Limit fats and oils to 2 to 3 servings each day. A serving is 1 teaspoon of vegetable oil or 2 tablespoons of salad dressing.  Limit sweets and added sugars to 5 servings or less a week. A serving is 1 tablespoon jelly or jam, ½ cup sorbet, or 1 cup of lemonade.    Eat less than 2,300 milligrams (mg) of sodium a day. If you limit your sodium to 1,500 mg a day, you can lower your blood pressure even more.  Be aware that all of these are the suggested number of servings for people who eat 1,800 to 2,000 calories a day. Your recommended number of servings may be different if you need more or fewer calories. Tips for success   Start small. Do not try to make dramatic changes to your diet all at once. You might feel that you are missing out on your favorite foods and then be more likely to not follow the plan. Make small changes, and stick with them. Once those changes become habit, add a few more changes.  Try some of the following:  ? Make it a goal to eat a fruit or vegetable at every meal and at snacks. This will make it easy to get the recommended amount of fruits and vegetables each day. ? Try yogurt topped with fruit and nuts for a snack or healthy dessert. ? Add lettuce, tomato, cucumber, and onion to sandwiches. ? Combine a ready-made pizza crust with low-fat mozzarella cheese and lots of vegetable toppings. Try using tomatoes, squash, spinach, broccoli, carrots, cauliflower, and onions. ? Have a variety of cut-up vegetables with a low-fat dip as an appetizer instead of chips and dip. ? Sprinkle sunflower seeds or chopped almonds over salads. Or try adding chopped walnuts or almonds to cooked vegetables. ? Try some vegetarian meals using beans and peas. Add garbanzo or kidney beans to salads. Make burritos and tacos with mashed shaw beans or black beans. Where can you learn more? Go to https://Pollsbeleno.Customer.io. org and sign in to your Storyvine account. Enter C849 in the [facility] box to learn more about \"DASH Diet: Care Instructions. \"     If you do not have an account, please click on the \"Sign Up Now\" link. Current as of: January 10, 2022               Content Version: 13.2  © 5530-8076 Healthwise, [facility]. Care instructions adapted under license by [facility] ([facility]). If you have questions about a medical condition or this instruction, always ask your healthcare professional. [address] any warranty or liability for your use of this information. Patient Education        Home Blood Pressure Test: About This Test  What is it? A home blood pressure test allows you to keep track of your blood pressure at home. Blood pressure is a measure of the force of blood against the walls of your arteries. Blood pressure readings include two numbers, such as 130/80 (say \"130 over 80\"). The first number is the systolic pressure. The second number isthe diastolic pressure. Why is this test done? You may do this test at home to:   Find out if you have high blood pressure.  Track your blood pressure if you have high blood pressure.  Track how well medicine is working to reduce high blood pressure.  Check how lifestyle changes, such as weight loss and exercise, are affecting blood pressure. How do you prepare for the test?  For at least 30 minutes before you take your blood pressure, don't exercise, drink caffeine, or smoke. Empty your bladder before the test. Sit quietly with your back straight and both feet on the floor for at least 5 minutes. Thishelps you take your blood pressure while you feel comfortable and relaxed. How is the test done?  If your doctor recommends it, take your blood pressure twice a day. Take it in the morning and evening.  Sit with your arm slightly bent and resting on a table so that your upper arm is at the same level as your heart.  Use the same arm each time you take your blood pressure.  Place the blood pressure cuff on the bare skin of your upper arm. You may have to roll up your sleeve, remove your arm from the sleeve, or take your shirt off.  Wrap the blood pressure cuff around your upper arm so that the lower edge of the cuff is about 1 inch above the bend of your elbow.    Do not move, talk, or text while you take your blood pressure. Follow the instructions that came with your blood pressure monitor. They mightbe different from the following.  Press the on/off button on the automatic monitor. Then you may need to wait until the screen says the monitor is ready.  Press the start button. The cuff will inflate and deflate by itself.  Your blood pressure numbers will appear on the screen.  Wait one minute and take your blood pressure again.  If your monitor does not automatically save your numbers, write them in your log book, along with the date and time. Follow-up care is a key part of your treatment and safety. Be sure to make and go to all appointments, and call your doctor if you arehaving problems. It's also a good idea to keep a list of the medicines you take. Where can you learn more? Go to https://SiftitpePhoneTell.The [employer] Sheep. org and sign in to your Audiotoniq account. Enter C427 in the [employer] box to learn more about \"Home Blood Pressure Test: About This Test.\"     If you do not have an account, please click on the \"Sign Up Now\" link. Current as of: January 10, 2022               Content Version: 13.2  © 2006-2022 Healthwise[employer]. Care instructions adapted under license by [facility] ([facility]). If you have questions about a medical condition or this instruction, always ask your healthcare professional. [address] any warranty or liability for your use of this information.

## 2022-04-04 NOTE — PROGRESS NOTES
[facility] PHYSICIAN SERVICES  [facility]  [address]  [address] [address]  Dept: [phone]  Dept Fax: [phone]  Loc: [phone]    Diane Bran is a 64 y.o. male who presents today for his medical conditions/complaints as noted below. Diane Bran is c/o of Establish Care (He is a new patient here to establish care. He was working in the yard Wednesday and hit himself with a tree limb in the testicles. It started swelling bad Friday, he went to [facility] urgent care Saturday. They were unable to do a ultrasound and told him to follow up with his PCP.) and New Patient        HPI:     HPI three 3year-old male presents today to establish care. He states he was working in the yard last Wednesday when he hit himself with a steak in the testicles. He states that he noticed later that he started having some pain. And then on Friday he said that it swelled severely. He was seen at [facility] urgent care on Saturday and they were unable to ultrasound him at that time but did start him on antibiotic and on Voltaren for pain. And they instructed him to follow-up with his PCP. He states that his depression he has seen previously he had not seen him in about 3 years and so they told him that he was no longer the PCP. Chief Complaint   Patient presents with   [address]     He is a new patient here to establish care. He was working in the yard Wednesday and hit himself with a tree limb in the testicles. It started swelling bad Friday, he went to [facility] urgent care Saturday. They were unable to do a ultrasound and told him to follow up with his PCP.  New Patient     Past Medical History:   Diagnosis Date    History of kidney stones     Osteoarthritis       History reviewed. No pertinent surgical history.     Vitals 8/4/4741   SYSTOLIC 985   DIASTOLIC 324   Site Left Upper Arm   Position Sitting   Cuff Size Large Adult   Pulse 100   Temp 97.8   SpO2 98   Weight 256 lb   Height 6' 0\" Body mass index 34.72 kg/m2   Some recent data might be hidden       History reviewed. No pertinent family history. Social History     Tobacco Use    Smoking status: Current Every Day Smoker     Packs/day: 0.50     Years: 40.00     Pack years: 20.00    Smokeless tobacco: Never Used   Substance Use Topics    Alcohol use: Yes      Current Outpatient Medications on File Prior to Visit   Medication Sig Dispense Refill    ciprofloxacin (CIPRO) 500 MG tablet Take 500 mg by mouth 2 times daily      diclofenac (VOLTAREN) 75 MG EC tablet Take 75 mg by mouth 2 times daily       No current facility-administered medications on file prior to visit. No Known Allergies    Health Maintenance   Topic Date Due    Hepatitis C screen  Never done    COVID-19 Vaccine (1) Never done    Depression Screen  Never done    HIV screen  Never done    DTaP/Tdap/Td vaccine (1 - Tdap) Never done    Lipid screen  Never done    Colorectal Cancer Screen  Never done    Shingles Vaccine (1 of 2) Never done    Flu vaccine (Season Ended) 09/01/2022    Hepatitis A vaccine  Aged Out    Hepatitis B vaccine  Aged Out    Hib vaccine  Aged Out    Meningococcal (ACWY) vaccine  Aged Out    Pneumococcal 0-64 years Vaccine  Aged Out       Subjective:      Review of Systems   Constitutional: Negative. Negative for fever and unexpected weight change. HENT: Negative. Eyes: Negative. Respiratory: Negative. Cardiovascular: Negative. Gastrointestinal: Negative. Endocrine: Negative. Genitourinary: Positive for difficulty urinating, scrotal swelling and testicular pain. Negative for dysuria, penile discharge, penile pain and penile swelling. Musculoskeletal: Negative. Skin: Negative. Allergic/Immunologic: Negative. Neurological: Negative. Negative for headaches. Hematological: Negative. Psychiatric/Behavioral: Negative. Objective:     Physical Exam  Vitals and nursing note reviewed.    Constitutional: General: He is not in acute distress. Appearance: Normal appearance. He is not ill-appearing or toxic-appearing. HENT:      Head: Normocephalic and atraumatic. Nose: Nose normal.      Mouth/Throat:      Mouth: Mucous membranes are moist.   Eyes:      Pupils: Pupils are equal, round, and reactive to light. Cardiovascular:      Rate and Rhythm: Normal rate and regular rhythm. Pulses: Normal pulses. Heart sounds: Normal heart sounds. No murmur heard. Pulmonary:      Effort: Pulmonary effort is normal. No respiratory distress. Breath sounds: Normal breath sounds. No wheezing, rhonchi or rales. Abdominal:      General: Bowel sounds are normal.      Palpations: Abdomen is soft. Hernia: There is no hernia in the left inguinal area or right inguinal area. Genitourinary:     Pubic Area: No rash or pubic lice. Penis: Normal.       Testes:         Right: Tenderness present. Mass or swelling not present. Left: Tenderness and swelling present. Epididymis:      Right: Enlarged. Tenderness present. Left: Mass present. Julius stage (genital): 5. Musculoskeletal:         General: Normal range of motion. Cervical back: Normal range of motion and neck supple. Lymphadenopathy:      Lower Body: No right inguinal adenopathy. No left inguinal adenopathy. Skin:     General: Skin is warm and dry. Coloration: Skin is not jaundiced or pale. Findings: No erythema or rash. Neurological:      Mental Status: He is alert and oriented to person, place, and time. Mental status is at baseline. Psychiatric:         Mood and Affect: Mood normal.         Behavior: Behavior normal.       BP (!) 158/100 (Site: Left Upper Arm, Position: Sitting, Cuff Size: Large Adult)   Pulse 100   Temp 97.8 °F (36.6 °C)   Ht 6' (1.829 m)   Wt 256 lb (116.1 kg)   SpO2 98%   BMI 34.72 kg/m²     Assessment:       Diagnosis Orders   1.  Testicular swelling, left  US SCROTUM AND TESTICLES   2. Hypertension, unspecified type           Plan:   1. Ultrasound scrotum left testicle-scheduled at [facility] tomorrow morning at 7 :30    2. Chronic condition untreated. Patient was told years ago that he had elevated blood pressure but at that time he wanted to see if he could get into shape and get down. He reports that it was very elevated when he was at the urgent care Saturday. . Is elevated today at 158/100 in the office. Monitor BP at home twice a day. Keep a journal and bring with you to your follow up appointment. Start lisinopril 20 mg daily  Monitor for any signs of dry cough       Patient given educational materials -see patient instructions. Discussed use, benefit, and side effects of prescribed medications. All patient questions answered. Pt voiced understanding. Reviewed health maintenance. Instructed to continue currentmedications, diet and exercise. Patient agreed with treatment plan. Follow up as directed. MEDICATIONS:  Orders Placed This Encounter   Medications    lisinopril (PRINIVIL;ZESTRIL) 20 MG tablet     Sig: Take 1 tablet by mouth daily     Dispense:  30 tablet     Refill:  5         ORDERS:  Orders Placed This Encounter   Procedures    US SCROTUM AND TESTICLES       Follow-up:  Return in about 4 weeks (around 5/2/2022). PATIENT INSTRUCTIONS:  Patient Instructions     Patient Education        Learning About ACE Inhibitors  What are ACE inhibitors? ACE (angiotensin-converting enzyme) inhibitors are medicines that lower blood pressure. They stop the release of an enzyme. This enzyme makes your blood vessels smaller. Without it, your blood vessels relax and get bigger. Thislowers your blood pressure. These medicines also increase how much water and salt go into your urine. Thisalso lowers blood pressure. You may take this kind of medicine if you have high blood pressure.  Or you maytake it if you have heart problems, kidney problems, or diabetes. If you have coronary artery disease, this medicine can help prevent heartattacks and strokes. Examples   benazepril (Lotensin)   enalapril (Vasotec)   lisinopril (Prinivil, Zestril)   ramipril (Altace)  This is not a complete list.  Possible side effects  Side effects may include:   A cough.  Low blood pressure. This can make you feel dizzy or weak.  Too much potassium in your body.  Swelling of your lips, tongue, or face. If the swelling is severe, you may need treatment right away. Severe swelling can make it hard to breathe, but this is rare. You may have other side effects or reactions not listed here. Check theinformation that comes with your medicine. What to know about taking this medicine   ACE inhibitors can cause a dry cough. Talk to your doctor if you have a dry cough. You may need a different medicine.  These medicines can cause an allergic reaction. This can cause a little swelling. Or it can cause red bumps on your skin that hurt. In rare cases, the swelling may make it hard for you to breathe.  Do not take this medicine if you are pregnant or plan to become pregnant.  Take your medicines exactly as prescribed. Call your doctor if you think you are having a problem with your medicine.  Check with your doctor or pharmacist before you use any other medicines. This includes over-the-counter medicines. Make sure your doctor knows all of the medicines, vitamins, herbal products, and supplements you take. Taking some medicines together can cause problems.  You may need regular blood tests. Where can you learn more? Go to https://idemamazeinabTela Solutions.[facility]. org and sign in to your ActivIdentity account. Enter P050 in the [facility] box to learn more about \"Learning About ACE Inhibitors. \"     If you do not have an account, please click on the \"Sign Up Now\" link.   Current as of: January 10, 2022               Content Version: 13.2  © 1263-2495 Healthwise, Incorporated. Care instructions adapted under license by [facility] ([facility]). If you have questions about a medical condition or this instruction, always ask your healthcare professional. [address] any warranty or liability for your use of this information. Patient Education        Learning About High Blood Pressure  What is high blood pressure? Blood pressure is a measure of how hard the blood pushes against the walls of your arteries. It's normal for blood pressure to go up and down throughout the day. But if it stays up, you have high blood pressure. Another name for highblood pressure is hypertension. Two numbers tell you your blood pressure. The first number is the systolic pressure (top number). It shows how hard the blood pushes when your heart is pumping. The second number is the diastolic pressure (bottom number). It shows how hard the blood pushes between heartbeats, when your heart is relaxed andfilling with blood. Your doctor will give you a goal for your blood pressure based on your health and your age. High blood pressure (hypertension) means that the top numberstays high, or the bottom number stays high, or both. High blood pressure increases the risk of stroke, heart attack, and otherproblems. What happens when you have high blood pressure?  Blood flows through your arteries with too much force. Over time, this can damage the heart and the walls of your arteries. But you can't feel it. High blood pressure usually doesn't cause symptoms.  High blood pressure makes your heart work harder. And that can lead to heart failure, which means your heart doesn't pump as much blood as your body needs.  Fat and calcium start to build up in your arteries. This buildup is called hardening of the arteries. It can cause many problems including a heart attack and stroke.  Arteries also carry blood and oxygen to organs like your eyes, kidneys, and brain.  If high blood pressure damages those arteries, it can lead to vision loss, kidney disease, stroke, and a higher risk of dementia. How can you prevent high blood pressure?  Stay at a healthy weight.  Try to limit how much sodium you eat to less than 2,300 milligrams (mg) a day. If you limit your sodium to 1,500 mg a day, you can lower your blood pressure even more. ? Buy foods that are labeled \"unsalted,\" \"sodium-free,\" or \"low-sodium. \" Foods labeled \"reduced-sodium\" and \"light sodium\" may still have too much sodium. ? Flavor your food with garlic, lemon juice, onion, vinegar, herbs, and spices instead of salt. Do not use soy sauce, steak sauce, onion salt, garlic salt, mustard, or ketchup on your food. ? Use less salt (or none) when recipes call for it. You can often use half the salt a recipe calls for without losing flavor.  Be physically active. Get at least 30 minutes of exercise on most days of the week. Walking is a good choice. You also may want to do other activities, such as running, swimming, cycling, or playing tennis or team sports.  Limit alcohol to 2 drinks a day for men and 1 drink a day for women.  Eat plenty of fruits, vegetables, and low-fat dairy products. Eat less saturated and total fats. How is high blood pressure treated?  Your doctor will suggest making lifestyle changes to help your heart. For example, your doctor may ask you to eat healthy foods, quit smoking, lose extra weight, and be more active.  If lifestyle changes don't help enough, your doctor may recommend that you take medicine.  When blood pressure is very high, medicines are needed to lower it. Follow-up care is a key part of your treatment and safety. Be sure to make and go to all appointments, and call your doctor if you are having problems. It's also a good idea to know your test results and keep alist of the medicines you take. Where can you learn more? Go to https://americo.health-Zubie. org and sign in to your Signifyd account. Enter P501 in the [facility] box to learn more about \"Learning About High Blood Pressure. \"     If you do not have an account, please click on the \"Sign Up Now\" link. Current as of: January 10, 2022               Content Version: 13.2  © 2006-2022 [employer]. Care instructions adapted under license by [facility] ([facility]). If you have questions about a medical condition or this instruction, always ask your healthcare professional. [address] any warranty or liability for your use of this information. Patient Education        DASH Diet: Care Instructions  Your Care Instructions     The DASH diet is an eating plan that can help lower your blood pressure. DASH stands for Dietary Approaches to Stop Hypertension. Hypertension is high bloodpressure. The DASH diet focuses on eating foods that are high in calcium, potassium, and magnesium. These nutrients can lower blood pressure. The foods that are highest in these nutrients are fruits, vegetables, low-fat dairy products, nuts, seeds, and legumes. But taking calcium, potassium, and magnesium supplements instead of eating foods that are high in those nutrients does not have the same effect. The DASH diet also includes whole grains, fish, and poultry. The DASH diet is one of several lifestyle changes your doctor may recommend to lower your high blood pressure. Your doctor may also want you to decrease the amount of sodium in your diet. Lowering sodium while following the DASH dietcan lower blood pressure even further than just the DASH diet alone. Follow-up care is a key part of your treatment and safety. Be sure to make and go to all appointments, and call your doctor if you are having problems. It's also a good idea to know your test results and keep alist of the medicines you take. How can you care for yourself at home? Following the DASH diet   Eat 4 to 5 servings of fruit each day.  A serving is 1 medium-sized piece of fruit, ½ cup chopped or canned fruit, 1/4 cup dried fruit, or 4 ounces (½ cup) of fruit juice. Choose fruit more often than fruit juice.  Eat 4 to 5 servings of vegetables each day. A serving is 1 cup of lettuce or raw leafy vegetables, ½ cup of chopped or cooked vegetables, or 4 ounces (½ cup) of vegetable juice. Choose vegetables more often than vegetable juice.  Get 2 to 3 servings of low-fat and fat-free dairy each day. A serving is 8 ounces of milk, 1 cup of yogurt, or 1 ½ ounces of cheese.  Eat 6 to 8 servings of grains each day. A serving is 1 slice of bread, 1 ounce of dry cereal, or ½ cup of cooked rice, pasta, or cooked cereal. Try to choose whole-grain products as much as possible.  Limit lean meat, poultry, and fish to 2 servings each day. A serving is 3 ounces, about the size of a deck of cards.  Eat 4 to 5 servings of nuts, seeds, and legumes (cooked dried beans, lentils, and split peas) each week. A serving is 1/3 cup of nuts, 2 tablespoons of seeds, or ½ cup of cooked beans or peas.  Limit fats and oils to 2 to 3 servings each day. A serving is 1 teaspoon of vegetable oil or 2 tablespoons of salad dressing.  Limit sweets and added sugars to 5 servings or less a week. A serving is 1 tablespoon jelly or jam, ½ cup sorbet, or 1 cup of lemonade.  Eat less than 2,300 milligrams (mg) of sodium a day. If you limit your sodium to 1,500 mg a day, you can lower your blood pressure even more.  Be aware that all of these are the suggested number of servings for people who eat 1,800 to 2,000 calories a day. Your recommended number of servings may be different if you need more or fewer calories. Tips for success   Start small. Do not try to make dramatic changes to your diet all at once. You might feel that you are missing out on your favorite foods and then be more likely to not follow the plan. Make small changes, and stick with them.  Once those changes become habit, add a few more changes.  Try some of the following:  ? Make it a goal to eat a fruit or vegetable at every meal and at snacks. This will make it easy to get the recommended amount of fruits and vegetables each day. ? Try yogurt topped with fruit and nuts for a snack or healthy dessert. ? Add lettuce, tomato, cucumber, and onion to sandwiches. ? Combine a ready-made pizza crust with low-fat mozzarella cheese and lots of vegetable toppings. Try using tomatoes, squash, spinach, broccoli, carrots, cauliflower, and onions. ? Have a variety of cut-up vegetables with a low-fat dip as an appetizer instead of chips and dip. ? Sprinkle sunflower seeds or chopped almonds over salads. Or try adding chopped walnuts or almonds to cooked vegetables. ? Try some vegetarian meals using beans and peas. Add garbanzo or kidney beans to salads. Make burritos and tacos with mashed shaw beans or black beans. Where can you learn more? Go to https://Inspur GrouppepicewGrain Management.EyeSpot. org and sign in to your Karaz account. Enter M661 in the [facility] box to learn more about \"DASH Diet: Care Instructions. \"     If you do not have an account, please click on the \"Sign Up Now\" link. Current as of: January 10, 2022               Content Version: 13.2  © 2006-2022 [employer]. Care instructions adapted under license by [facility] ([facility]). If you have questions about a medical condition or this instruction, always ask your healthcare professional. [place] [address] any warranty or liability for your use of this information. Patient Education        Home Blood Pressure Test: About This Test  What is it? A home blood pressure test allows you to keep track of your blood pressure at home. Blood pressure is a measure of the force of blood against the walls of your arteries. Blood pressure readings include two numbers, such as 130/80 (say \"130 over 80\").  The first number is the systolic pressure. The second number isthe diastolic pressure. Why is this test done? You may do this test at home to:   Find out if you have high blood pressure.  Track your blood pressure if you have high blood pressure.  Track how well medicine is working to reduce high blood pressure.  Check how lifestyle changes, such as weight loss and exercise, are affecting blood pressure. How do you prepare for the test?  For at least 30 minutes before you take your blood pressure, don't exercise, drink caffeine, or smoke. Empty your bladder before the test. Sit quietly with your back straight and both feet on the floor for at least 5 minutes. Thishelps you take your blood pressure while you feel comfortable and relaxed. How is the test done?  If your doctor recommends it, take your blood pressure twice a day. Take it in the morning and evening.  Sit with your arm slightly bent and resting on a table so that your upper arm is at the same level as your heart.  Use the same arm each time you take your blood pressure.  Place the blood pressure cuff on the bare skin of your upper arm. You may have to roll up your sleeve, remove your arm from the sleeve, or take your shirt off.  Wrap the blood pressure cuff around your upper arm so that the lower edge of the cuff is about 1 inch above the bend of your elbow.  Do not move, talk, or text while you take your blood pressure. Follow the instructions that came with your blood pressure monitor. They mightbe different from the following.  Press the on/off button on the automatic monitor. Then you may need to wait until the screen says the monitor is ready.  Press the start button. The cuff will inflate and deflate by itself.  Your blood pressure numbers will appear on the screen.  Wait one minute and take your blood pressure again.    If your monitor does not automatically save your numbers, write them in your log book, along with the date and time.  Follow-up care is a key part of your treatment and safety. Be sure to make and go to all appointments, and call your doctor if you arehaving problems. It's also a good idea to keep a list of the medicines you take. Where can you learn more? Go to https://chpepiceweb.[employer]. org and sign in to your Games2Win account. Enter C427 in the [employer] box to learn more about \"Home Blood Pressure Test: About This Test.\"     If you do not have an account, please click on the \"Sign Up Now\" link. Current as of: January 10, 2022               Content Version: 13.2  © 2006-2022 Healthwise, [employer]. Care instructions adapted under license by [facility] ([facility]). If you have questions about a medical condition or this instruction, always ask your healthcare professional. [place] [address] any warranty or liability for your use of this information. Electronically signed by LILLIE Min NP on 4/4/2022 at 4:11 PM    EMR Dragon/transcription disclaimer:  Much of thisencounter note is electronic transcription/translation of spoken language to printed texts. The electronic translation of spoken language may be erroneous, or at times, nonsensical words or phrases may be inadvertentlytranscribed.   Although I have reviewed the note for such errors, some may still exist.

## 2022-04-05 DIAGNOSIS — N50.89 TESTICULAR SWELLING, LEFT: ICD-10-CM

## 2022-05-02 ENCOUNTER — OFFICE VISIT (OUTPATIENT)
Dept: PRIMARY CARE CLINIC | Age: 62
End: 2022-05-02

## 2022-05-02 VITALS
OXYGEN SATURATION: 97 % | HEART RATE: 104 BPM | TEMPERATURE: 97.4 F | HEIGHT: 72 IN | SYSTOLIC BLOOD PRESSURE: 134 MMHG | WEIGHT: 248 LBS | BODY MASS INDEX: 33.59 KG/M2 | DIASTOLIC BLOOD PRESSURE: 82 MMHG

## 2022-05-02 DIAGNOSIS — I10 HYPERTENSION, UNSPECIFIED TYPE: Primary | ICD-10-CM

## 2022-05-02 DIAGNOSIS — N45.1 EPIDIDYMITIS: ICD-10-CM

## 2022-05-02 PROCEDURE — 99213 OFFICE O/P EST LOW 20 MIN: CPT | Performed by: NURSE PRACTITIONER

## 2022-05-02 RX ORDER — LISINOPRIL 20 MG/1
20 TABLET ORAL DAILY
Qty: 90 TABLET | Refills: 2 | Status: SHIPPED | OUTPATIENT
Start: 2022-05-02 | End: 2022-11-03 | Stop reason: SDUPTHER

## 2022-05-02 ASSESSMENT — ENCOUNTER SYMPTOMS
RESPIRATORY NEGATIVE: 1
GASTROINTESTINAL NEGATIVE: 1
EYES NEGATIVE: 1
ALLERGIC/IMMUNOLOGIC NEGATIVE: 1

## 2022-05-02 NOTE — PROGRESS NOTES
[facility] PHYSICIAN SERVICES  [facility]  [address] [place] [address]  [address] [place] [address]  Dept: [phone]  Dept Fax: [phone]  Loc: [phone]    Will Rahman is a 64 y.o. male who presents today for his medical conditions/complaints as noted below. Will Rahman is c/o of 1 Month Follow-Up (He is here for a 4 week follow up.)        HPI:     HPI this 60-year-old male presents today for 1 month follow-up. He states that he is doing much better. He states that the swelling and pain in his testicle has completely resolved. He also reports that the blood pressure medicine seems to be doing a very good job. He states that his readings have been normal.  He denies any side effects any cough or any issues. Chief Complaint   Patient presents with    1 Month Follow-Up     He is here for a 4 week follow up. Past Medical History:   Diagnosis Date    History of kidney stones     Osteoarthritis       No past surgical history on file. Vitals 5/2/2022 5/8/8887   SYSTOLIC 828 525   DIASTOLIC 82 018   Site Left Upper Arm Left Upper Arm   Position Sitting Sitting   Cuff Size Large Adult Large Adult   Pulse 104 100   Temp 97.4 97.8   SpO2 97 98   Weight 248 lb 256 lb   Height 6' 0\" 6' 0\"   Body mass index 33.63 kg/m2 34.72 kg/m2   Some recent data might be hidden       No family history on file. Social History     Tobacco Use    Smoking status: Current Every Day Smoker     Packs/day: 0.50     Years: 40.00     Pack years: 20.00    Smokeless tobacco: Never Used   Substance Use Topics    Alcohol use: Yes      No current outpatient medications on file prior to visit. No current facility-administered medications on file prior to visit.      No Known Allergies    Health Maintenance   Topic Date Due    COVID-19 Vaccine (1) Never done    Pneumococcal 0-64 years Vaccine (1 - PCV) Never done    HIV screen  Never done    Hepatitis C screen  Never done    DTaP/Tdap/Td vaccine (1 - Tdap) Never done   [place] Diabetes screen  Never done    Lipids  Never done    Colorectal Cancer Screen  Never done    Shingles vaccine (1 of 2) Never done    Low dose CT lung screening  Never done    Flu vaccine (Season Ended) 09/01/2022    Depression Screen  04/04/2023    Potassium  04/04/2023    Creatinine  04/04/2023    Hepatitis A vaccine  Aged Out    Hepatitis B vaccine  Aged Out    Hib vaccine  Aged Out    Meningococcal (ACWY) vaccine  Aged Out       Subjective:      Review of Systems   Constitutional: Negative. Negative for fever and unexpected weight change. HENT: Positive for ear pain. Eyes: Negative. Respiratory: Negative. Cardiovascular: Negative. Gastrointestinal: Negative. Endocrine: Negative. Genitourinary: Negative. Musculoskeletal: Negative. Skin: Negative. Allergic/Immunologic: Negative. Neurological: Negative. Hematological: Negative. Psychiatric/Behavioral: Negative. Objective:     Physical Exam  Vitals and nursing note reviewed. Constitutional:       General: He is not in acute distress. Appearance: Normal appearance. He is not ill-appearing or toxic-appearing. HENT:      Head: Normocephalic and atraumatic. Right Ear: Tympanic membrane, ear canal and external ear normal. There is no impacted cerumen. Left Ear: Ear canal and external ear normal. There is no impacted cerumen. Nose: Rhinorrhea present. Mouth/Throat:      Mouth: Mucous membranes are moist.   Eyes:      Extraocular Movements: Extraocular movements intact. Pupils: Pupils are equal, round, and reactive to light. Cardiovascular:      Rate and Rhythm: Normal rate and regular rhythm. Pulses: Normal pulses. Heart sounds: Normal heart sounds. Pulmonary:      Effort: Pulmonary effort is normal. No respiratory distress. Breath sounds: Normal breath sounds. No wheezing, rhonchi or rales.    Abdominal:      General: Bowel sounds are normal.      Palpations: Abdomen is soft. Musculoskeletal:         General: Normal range of motion. Cervical back: Normal range of motion and neck supple. Skin:     General: Skin is warm and dry. Coloration: Skin is not jaundiced or pale. Findings: No erythema or rash. Neurological:      Mental Status: He is alert and oriented to person, place, and time. Mental status is at baseline. Psychiatric:         Mood and Affect: Mood normal.         Behavior: Behavior normal.       /82 (Site: Left Upper Arm, Position: Sitting, Cuff Size: Large Adult)   Pulse 104   Temp 97.4 °F (36.3 °C)   Ht 6' (1.829 m)   Wt 248 lb (112.5 kg)   SpO2 97%   BMI 33.63 kg/m²     Assessment:       Diagnosis Orders   1. Hypertension, unspecified type     2. Epididymitis           Plan:   1. Chronic condition stable reading in office today 134/82. BP Readings from Last 3 Encounters:   05/02/22 134/82   04/04/22 (!) 158/100    Monitor BP at home twice a day. Keep a journal and bring with you to your follow up appointment. Patient is well controlled on current dose of lisinopril continue lisinopril 20 mg daily. 2.  Acute condition resolved. Patient reports that his symptoms have completely resolved. Patient given educational materials -see patient instructions. Discussed use, benefit, and side effects of prescribed medications. All patient questions answered. Pt voiced understanding. Reviewed health maintenance. Instructed to continue currentmedications, diet and exercise. Patient agreed with treatment plan. Follow up as directed. MEDICATIONS:  Orders Placed This Encounter   Medications    lisinopril (PRINIVIL;ZESTRIL) 20 MG tablet     Sig: Take 1 tablet by mouth daily     Dispense:  90 tablet     Refill:  2         ORDERS:  No orders of the defined types were placed in this encounter. Follow-up:  Return in about 6 months (around 11/2/2022).     PATIENT INSTRUCTIONS:  There are no Patient Instructions on file for this visit. Electronically signed by LILLIE Can NP on 5/2/2022 at 5:53 PM    EMR Dragon/transcription disclaimer:  Much of thisencounter note is electronic transcription/translation of spoken language to printed texts. The electronic translation of spoken language may be erroneous, or at times, nonsensical words or phrases may be inadvertentlytranscribed.   Although I have reviewed the note for such errors, some may still exist.

## 2022-11-03 ENCOUNTER — OFFICE VISIT (OUTPATIENT)
Dept: PRIMARY CARE CLINIC | Age: 62
End: 2022-11-03

## 2022-11-03 VITALS
HEART RATE: 96 BPM | DIASTOLIC BLOOD PRESSURE: 86 MMHG | TEMPERATURE: 98 F | BODY MASS INDEX: 33.05 KG/M2 | SYSTOLIC BLOOD PRESSURE: 126 MMHG | OXYGEN SATURATION: 98 % | HEIGHT: 72 IN | WEIGHT: 244 LBS

## 2022-11-03 DIAGNOSIS — L03.211 CELLULITIS OF FACE: ICD-10-CM

## 2022-11-03 DIAGNOSIS — I10 HYPERTENSION, UNSPECIFIED TYPE: Primary | ICD-10-CM

## 2022-11-03 PROCEDURE — 3074F SYST BP LT 130 MM HG: CPT | Performed by: NURSE PRACTITIONER

## 2022-11-03 PROCEDURE — 3079F DIAST BP 80-89 MM HG: CPT | Performed by: NURSE PRACTITIONER

## 2022-11-03 PROCEDURE — 99213 OFFICE O/P EST LOW 20 MIN: CPT | Performed by: NURSE PRACTITIONER

## 2022-11-03 RX ORDER — LISINOPRIL 20 MG/1
20 TABLET ORAL DAILY
Qty: 90 TABLET | Refills: 2 | Status: SHIPPED | OUTPATIENT
Start: 2022-11-03

## 2022-11-03 SDOH — ECONOMIC STABILITY: FOOD INSECURITY: WITHIN THE PAST 12 MONTHS, YOU WORRIED THAT YOUR FOOD WOULD RUN OUT BEFORE YOU GOT MONEY TO BUY MORE.: NEVER TRUE

## 2022-11-03 SDOH — ECONOMIC STABILITY: FOOD INSECURITY: WITHIN THE PAST 12 MONTHS, THE FOOD YOU BOUGHT JUST DIDN'T LAST AND YOU DIDN'T HAVE MONEY TO GET MORE.: NEVER TRUE

## 2022-11-03 ASSESSMENT — ENCOUNTER SYMPTOMS
GASTROINTESTINAL NEGATIVE: 1
RESPIRATORY NEGATIVE: 1
DIARRHEA: 0
ABDOMINAL PAIN: 0
CONSTIPATION: 0
NAUSEA: 0
WHEEZING: 0
COUGH: 0
SHORTNESS OF BREATH: 0
EYES NEGATIVE: 1
VOMITING: 0
ALLERGIC/IMMUNOLOGIC NEGATIVE: 1

## 2022-11-03 ASSESSMENT — SOCIAL DETERMINANTS OF HEALTH (SDOH): HOW HARD IS IT FOR YOU TO PAY FOR THE VERY BASICS LIKE FOOD, HOUSING, MEDICAL CARE, AND HEATING?: NOT HARD AT ALL

## 2022-11-03 NOTE — PROGRESS NOTES
[facility] PHYSICIAN SERVICES  [facility]  [address] [place] [address]  [address] [place] [address]  Dept: [phone]  Dept Fax: [phone]  Loc: [phone]    Stevne Maria is a 58 y.o. male who presents today for his medical conditions/complaints as noted below. Steven Maria is c/o of 6 Month Follow-Up (HTN. He states his blood pressure is doing good. He has a hard/swollen place on his chin that comes up off and on.)        HPI:     HPI this 58-year-old male presents today for 6-month follow-up on his high blood pressure. He states that his blood pressure is doing very well. He states that he feels good. He states that he does take his lisinopril every day. He states he does have a spot that comes up on his chin that seems to be very firm and red but then after a day or 2 of using antibacterial ointment on it it goes back down and goes away. He states it does not seem to be related to when he shaves. He denies any fever or other symptoms at that time. Chief Complaint   Patient presents with    6 Month Follow-Up     HTN. He states his blood pressure is doing good. He has a hard/swollen place on his chin that comes up off and on. Past Medical History:   Diagnosis Date    History of kidney stones     Osteoarthritis       No past surgical history on file. Vitals 11/3/2022 5/2/2022 5/3/4879   SYSTOLIC 742 605 107   DIASTOLIC 86 82 804   Site Left Upper Arm Left Upper Arm Left Upper Arm   Position Sitting Sitting Sitting   Cuff Size Large Adult Large Adult Large Adult   Pulse 96 104 100   Temp 98 97.4 97.8   SpO2 98 97 98   Weight 244 lb 248 lb 256 lb   Height 6' 0\" 6' 0\" 6' 0\"   Body mass index 33.09 kg/m2 33.63 kg/m2 34.72 kg/m2   Some recent data might be hidden       No family history on file.     Social History     Tobacco Use    Smoking status: Every Day     Packs/day: 0.50     Years: 40.00     Pack years: 20.00     Types: Cigarettes    Smokeless tobacco: Never   Substance Use Topics    Alcohol use: Yes      No current outpatient medications on file prior to visit. No current facility-administered medications on file prior to visit. No Known Allergies    Health Maintenance   Topic Date Due    COVID-19 Vaccine (1) Never done    Pneumococcal 0-64 years Vaccine (1 - PCV) Never done    HIV screen  Never done    Hepatitis C screen  Never done    DTaP/Tdap/Td vaccine (1 - Tdap) Never done    Diabetes screen  Never done    Lipids  Never done    Colorectal Cancer Screen  Never done    Shingles vaccine (1 of 2) Never done    Low dose CT lung screening  Never done    Flu vaccine (1) Never done    Depression Screen  04/04/2023    Hepatitis A vaccine  Aged Out    Hib vaccine  Aged Out    Meningococcal (ACWY) vaccine  Aged Out       Subjective:      Review of Systems   Constitutional: Negative. Negative for chills, fatigue, fever and unexpected weight change. HENT: Negative. Eyes: Negative. Respiratory: Negative. Negative for cough, shortness of breath and wheezing. Cardiovascular: Negative. Negative for chest pain. Gastrointestinal: Negative. Negative for abdominal pain, constipation, diarrhea, nausea and vomiting. Endocrine: Negative. Genitourinary: Negative. Negative for difficulty urinating and dysuria. Musculoskeletal:  Positive for arthralgias, gait problem and joint swelling. Skin: Negative. Allergic/Immunologic: Negative. Neurological:  Negative for headaches. Hematological: Negative. Psychiatric/Behavioral: Negative. Negative for dysphoric mood, self-injury, sleep disturbance and suicidal ideas. The patient is not nervous/anxious. Objective:     Physical Exam  Vitals and nursing note reviewed. Constitutional:       General: He is not in acute distress. Appearance: Normal appearance. He is not ill-appearing or toxic-appearing. HENT:      Head: Normocephalic and atraumatic.         Nose: Nose normal.      Mouth/Throat:      Mouth: Mucous membranes are moist. Eyes:      Pupils: Pupils are equal, round, and reactive to light. Cardiovascular:      Rate and Rhythm: Normal rate and regular rhythm. Pulses: Normal pulses. Heart sounds: Normal heart sounds. Pulmonary:      Effort: Pulmonary effort is normal. No respiratory distress. Breath sounds: Normal breath sounds. No wheezing, rhonchi or rales. Abdominal:      General: Bowel sounds are normal.      Palpations: Abdomen is soft. Musculoskeletal:         General: Normal range of motion. Cervical back: Normal range of motion. Skin:     General: Skin is warm and dry. Coloration: Skin is not jaundiced or pale. Findings: No erythema or rash. Neurological:      Mental Status: He is alert and oriented to person, place, and time. Mental status is at baseline. Psychiatric:         Mood and Affect: Mood normal.         Behavior: Behavior normal.     /86 (Site: Left Upper Arm, Position: Sitting, Cuff Size: Large Adult)   Pulse 96   Temp 98 °F (36.7 °C)   Ht 6' (1.829 m)   Wt 244 lb (110.7 kg)   SpO2 98%   BMI 33.09 kg/m²     Assessment:       Diagnosis Orders   1. Hypertension, unspecified type        2. Cellulitis of face              Plan:   Chronic condition stable. Reading in office today 126/86  Continue lisinopril 20 mg daily  Refill prescription sent to pharmacy. Patient reports that he feels well and his blood pressure is well controlled all the time on his current dose of the lisinopril. BP Readings from Last 3 Encounters:   11/03/22 126/86   05/02/22 134/82   04/04/22 (!) 158/100      Monitor BP at home twice a day. Keep a journal and bring with you to your follow up appointment. 2.  Mild erythema and firmness noted to chin. Patient states that this comes up and then will go down a couple days for  [facility] face with Dial soap twice a day to decrease bacterial load    Continue to apply Neosporin ointment to site when it is flared  .   May also add some hydrocortisone cream twice a day  If it fails to respond to these therapies or if he develops fever or purulent drainage she is to contact our office at that time. Consider oral antibiotic if symptoms worsen. Patient given educational materials -see patient instructions. Discussed use, benefit, and side effects of prescribed medications. All patient questions answered. Pt voiced understanding. Reviewed health maintenance. Instructed to continue currentmedications, diet and exercise. Patient agreed with treatment plan. Follow up as directed. MEDICATIONS:  Orders Placed This Encounter   Medications    lisinopril (PRINIVIL;ZESTRIL) 20 MG tablet     Sig: Take 1 tablet by mouth daily     Dispense:  90 tablet     Refill:  2           ORDERS:  No orders of the defined types were placed in this encounter. Follow-up:  Return in about 6 months (around 5/3/2023). PATIENT INSTRUCTIONS:  Patient Instructions   Sprain/strain   First 24 hours, use ice to injury site for 15 minutes at a time. After 48 hours, may alternate ice/heat 15 minutes each. R.I. C.E. therapy = rest, ice, compression and elevation. May use ace wrap to injured area for compression. Use Ibuprofen or other antiinflammatory for pain and inflammation. If no open skin areas, OTC topical lidocaine such as Icy Hot or capsaicin creams may be applied for pain relief. Slow stretches of area may help reduce spasms and improve range of motion. Alternate Voltaren cream with biofreeze   Electronically signed by LILLIE Norton NP on 11/3/2022 at 3:00 PM    EMR Dragon/transcription disclaimer:  Much of thisencounter note is electronic transcription/translation of spoken language to printed texts. The electronic translation of spoken language may be erroneous, or at times, nonsensical words or phrases may be inadvertentlytranscribed.   Although I have reviewed the note for such errors, some may still exist.

## 2022-11-03 NOTE — PATIENT INSTRUCTIONS
Sprain/strain   First 24 hours, use ice to injury site for 15 minutes at a time. After 48 hours, may alternate ice/heat 15 minutes each. R.I. C.E. therapy = rest, ice, compression and elevation. May use ace wrap to injured area for compression. Use Ibuprofen or other antiinflammatory for pain and inflammation. If no open skin areas, OTC topical lidocaine such as Icy Hot or capsaicin creams may be applied for pain relief. Slow stretches of area may help reduce spasms and improve range of motion.       Alternate Voltaren cream with biofreeze

## 2023-03-23 ENCOUNTER — OFFICE VISIT (OUTPATIENT)
Dept: PRIMARY CARE CLINIC | Age: 63
End: 2023-03-23

## 2023-03-23 VITALS
HEIGHT: 72 IN | SYSTOLIC BLOOD PRESSURE: 120 MMHG | RESPIRATION RATE: 18 BRPM | BODY MASS INDEX: 32.83 KG/M2 | WEIGHT: 242.4 LBS | HEART RATE: 105 BPM | OXYGEN SATURATION: 98 % | TEMPERATURE: 98 F | DIASTOLIC BLOOD PRESSURE: 74 MMHG

## 2023-03-23 DIAGNOSIS — I10 HYPERTENSION, UNSPECIFIED TYPE: ICD-10-CM

## 2023-03-23 DIAGNOSIS — L23.7 POISON IVY: Primary | ICD-10-CM

## 2023-03-23 PROBLEM — R97.20 ELEVATED PSA: Status: ACTIVE | Noted: 2017-10-17

## 2023-03-23 PROBLEM — N40.1 BENIGN PROSTATIC HYPERPLASIA WITH LOWER URINARY TRACT SYMPTOMS: Status: ACTIVE | Noted: 2017-10-17

## 2023-03-23 PROBLEM — N20.0 RENAL STONE: Status: ACTIVE | Noted: 2017-12-05

## 2023-03-23 PROBLEM — N39.0 URINARY TRACT INFECTION WITHOUT HEMATURIA: Status: ACTIVE | Noted: 2017-10-17

## 2023-03-23 PROBLEM — N20.1 URETERAL STONE: Status: ACTIVE | Noted: 2018-04-13

## 2023-03-23 PROCEDURE — 3078F DIAST BP <80 MM HG: CPT | Performed by: NURSE PRACTITIONER

## 2023-03-23 PROCEDURE — 99213 OFFICE O/P EST LOW 20 MIN: CPT | Performed by: NURSE PRACTITIONER

## 2023-03-23 PROCEDURE — 3074F SYST BP LT 130 MM HG: CPT | Performed by: NURSE PRACTITIONER

## 2023-03-23 RX ORDER — PREDNISONE 10 MG/1
TABLET ORAL
Qty: 18 TABLET | Refills: 0 | Status: SHIPPED | OUTPATIENT
Start: 2023-03-23

## 2023-03-23 SDOH — ECONOMIC STABILITY: INCOME INSECURITY: HOW HARD IS IT FOR YOU TO PAY FOR THE VERY BASICS LIKE FOOD, HOUSING, MEDICAL CARE, AND HEATING?: NOT VERY HARD

## 2023-03-23 SDOH — ECONOMIC STABILITY: FOOD INSECURITY: WITHIN THE PAST 12 MONTHS, YOU WORRIED THAT YOUR FOOD WOULD RUN OUT BEFORE YOU GOT MONEY TO BUY MORE.: NEVER TRUE

## 2023-03-23 SDOH — ECONOMIC STABILITY: FOOD INSECURITY: WITHIN THE PAST 12 MONTHS, THE FOOD YOU BOUGHT JUST DIDN'T LAST AND YOU DIDN'T HAVE MONEY TO GET MORE.: NEVER TRUE

## 2023-03-23 SDOH — ECONOMIC STABILITY: HOUSING INSECURITY
IN THE LAST 12 MONTHS, WAS THERE A TIME WHEN YOU DID NOT HAVE A STEADY PLACE TO SLEEP OR SLEPT IN A SHELTER (INCLUDING NOW)?: NO

## 2023-03-23 ASSESSMENT — ENCOUNTER SYMPTOMS
ALLERGIC/IMMUNOLOGIC NEGATIVE: 1
WHEEZING: 0
COUGH: 0
EYES NEGATIVE: 1
SHORTNESS OF BREATH: 0
GASTROINTESTINAL NEGATIVE: 1
RESPIRATORY NEGATIVE: 1

## 2023-03-23 ASSESSMENT — PATIENT HEALTH QUESTIONNAIRE - PHQ9
SUM OF ALL RESPONSES TO PHQ9 QUESTIONS 1 & 2: 0
SUM OF ALL RESPONSES TO PHQ QUESTIONS 1-9: 0
SUM OF ALL RESPONSES TO PHQ QUESTIONS 1-9: 0
1. LITTLE INTEREST OR PLEASURE IN DOING THINGS: 0
SUM OF ALL RESPONSES TO PHQ QUESTIONS 1-9: 0
SUM OF ALL RESPONSES TO PHQ QUESTIONS 1-9: 0
2. FEELING DOWN, DEPRESSED OR HOPELESS: 0

## 2023-03-23 NOTE — PROGRESS NOTES
each site    2. Chronic condition   Initial reading in office today was elevated at 158/84 however repeat was 120/74. Continue lisinopril 20 mg daily  BP Readings from Last 3 Encounters:   03/23/23 120/74   11/03/22 126/86   05/02/22 134/82    Monitor BP at home twice a day. Keep a journal and bring with you to your follow up appointment. Patient given educational materials -see patient instructions. Discussed use, benefit, and side effects of prescribed medications. All patient questions answered. Pt voiced understanding. Reviewed health maintenance. Instructed to continue currentmedications, diet and exercise. Patient agreed with treatment plan. Follow up as directed. MEDICATIONS:  Orders Placed This Encounter   Medications    predniSONE (DELTASONE) 10 MG tablet     Sig: Days 1-3 take 1 PO TID, days 4-6 take 1 PO BID, days 7-9 take 1 PO daily. Dispense:  18 tablet     Refill:  0    triamcinolone (KENALOG) 0.1 % ointment     Sig: Apply topically 2 times daily for 7 days     Dispense:  1 each     Refill:  0         ORDERS:  No orders of the defined types were placed in this encounter. Follow-up:  Return if symptoms worsen or fail to improve. PATIENT INSTRUCTIONS:  Patient Instructions   Take Benadryl as needed for itching. May take tylenol or ibuprofen for comfort or pain. Avoid hot showers, spas, or extreme sweating as these will result in an increased reaction and worsening symptoms. May use cool compresses or ice packs to areas of inflammation. Do not scratch to prevent infection. May rub lightly with soft cloth. Use caladryl or ivy dry lotion to sites to promote drying. Call if symptoms do not improve. Or if rash spreads to eyes or nose. Take Benadryl as needed for itching. May take tylenol or ibuprofen for comfort or pain. Avoid hot showers, spas, or extreme sweating as these will result in an increased reaction and worsening symptoms.    May use cool compresses or ice packs

## 2023-03-23 NOTE — PATIENT INSTRUCTIONS
Take Benadryl as needed for itching. May take tylenol or ibuprofen for comfort or pain. Avoid hot showers, spas, or extreme sweating as these will result in an increased reaction and worsening symptoms. May use cool compresses or ice packs to areas of inflammation. Do not scratch to prevent infection. May rub lightly with soft cloth. Use caladryl or ivy dry lotion to sites to promote drying. Call if symptoms do not improve. Or if rash spreads to eyes or nose. Take Benadryl as needed for itching. May take tylenol or ibuprofen for comfort or pain. Avoid hot showers, spas, or extreme sweating as these will result in an increased reaction and worsening symptoms. May use cool compresses or ice packs to areas of inflammation. Do not scratch to prevent infection. May rub lightly with soft cloth. Use caladryl or ivy dry lotion to sites to promote drying. Call if symptoms do not improve. Or if rash spreads to eyes or nose.      Prednisone dose pack

## 2023-09-06 ENCOUNTER — OFFICE VISIT (OUTPATIENT)
Dept: PRIMARY CARE CLINIC | Age: 63
End: 2023-09-06
Payer: COMMERCIAL

## 2023-09-06 VITALS
HEIGHT: 72 IN | SYSTOLIC BLOOD PRESSURE: 128 MMHG | BODY MASS INDEX: 33.32 KG/M2 | OXYGEN SATURATION: 95 % | WEIGHT: 246 LBS | TEMPERATURE: 97.7 F | DIASTOLIC BLOOD PRESSURE: 86 MMHG | HEART RATE: 88 BPM

## 2023-09-06 DIAGNOSIS — I10 HYPERTENSION, UNSPECIFIED TYPE: Primary | ICD-10-CM

## 2023-09-06 PROCEDURE — 3074F SYST BP LT 130 MM HG: CPT | Performed by: NURSE PRACTITIONER

## 2023-09-06 PROCEDURE — 3079F DIAST BP 80-89 MM HG: CPT | Performed by: NURSE PRACTITIONER

## 2023-09-06 PROCEDURE — 99213 OFFICE O/P EST LOW 20 MIN: CPT | Performed by: NURSE PRACTITIONER

## 2023-09-06 ASSESSMENT — ENCOUNTER SYMPTOMS
GASTROINTESTINAL NEGATIVE: 1
WHEEZING: 0
EYES NEGATIVE: 1
SHORTNESS OF BREATH: 0
ABDOMINAL PAIN: 0
ALLERGIC/IMMUNOLOGIC NEGATIVE: 1
VOMITING: 0
DIARRHEA: 0
NAUSEA: 0
COUGH: 0
CONSTIPATION: 0
RESPIRATORY NEGATIVE: 1
STRIDOR: 0

## 2023-09-06 NOTE — PROGRESS NOTES
[facility] PHYSICIAN SERVICES  [facility] [address] [address]  [address] [place] [address]  Dept: [phone]  Dept Fax: [phone]  Loc: [phone]    Annamaria Goff is a 61 y.o. male who presents today for his medical conditions/complaints as noted below. Annamaria Goff is c/o of Follow-up (F/u on HTN. Has been taking BP at home, did not log but says average is no higher than 130/80. )        HPI:     HPI this 77-year-old male presents today for follow-up on his hypertension. He states that his blood pressure readings at home have been good. States that he is not having issues with the lisinopril. He denies any cough or other side effects or reactions. Does state he now has Medicare coverage with [employer]. He would like to see about getting a physical done. Chief Complaint   Patient presents with    Follow-up     F/u on HTN. Has been taking BP at home, did not log but says average is no higher than 130/80. Past Medical History:   Diagnosis Date    History of kidney stones     Osteoarthritis       No past surgical history on file. Vitals 9/6/2023 3/23/2023 3/23/2023 11/3/2022 5/2/2022 4/4/2027   SYSTOLIC 922 184 795 247 222 332   DIASTOLIC 86 74 84 86 82 846   Site - - Left Upper Arm Left Upper Arm Left Upper Arm Left Upper Arm   Position - - Sitting Sitting Sitting Sitting   Cuff Size - - Large Adult Large Adult Large Adult Large Adult   Pulse 88 - 105 96 104 100   Temp 97.7 - 98 98 97.4 97.8   Resp - - 18 - - -   SpO2 95 - 98 98 97 98   Weight 246 lb - 242 lb 6.4 oz 244 lb 248 lb 256 lb   Height 6' 0\" - 6' 0\" 6' 0\" 6' 0\" 6' 0\"   Body Mass Index 33.36 kg/m2 - 32.87 kg/m2 33.09 kg/m2 33.63 kg/m2 34.72 kg/m2   Some recent data might be hidden       No family history on file.     Social History     Tobacco Use    Smoking status: Every Day     Packs/day: 0.50     Years: 40.00     Pack years: 20.00     Types: Cigarettes    Smokeless tobacco: Never   Substance Use Topics    Alcohol use: Yes      Current

## 2023-09-14 ENCOUNTER — OFFICE VISIT (OUTPATIENT)
Dept: PRIMARY CARE CLINIC | Age: 63
End: 2023-09-14

## 2023-09-14 VITALS
TEMPERATURE: 97.1 F | OXYGEN SATURATION: 96 % | SYSTOLIC BLOOD PRESSURE: 140 MMHG | DIASTOLIC BLOOD PRESSURE: 90 MMHG | HEIGHT: 72 IN | RESPIRATION RATE: 18 BRPM | HEART RATE: 91 BPM | WEIGHT: 246.2 LBS | BODY MASS INDEX: 33.35 KG/M2

## 2023-09-14 DIAGNOSIS — Z00.00 WELCOME TO MEDICARE PREVENTIVE VISIT: Primary | ICD-10-CM

## 2023-09-14 DIAGNOSIS — I10 HYPERTENSION, UNSPECIFIED TYPE: ICD-10-CM

## 2023-09-14 DIAGNOSIS — Z12.11 SCREENING FOR COLON CANCER: ICD-10-CM

## 2023-09-14 DIAGNOSIS — Z11.4 ENCOUNTER FOR SCREENING FOR HIV: ICD-10-CM

## 2023-09-14 DIAGNOSIS — M79.644 THUMB PAIN, RIGHT: ICD-10-CM

## 2023-09-14 DIAGNOSIS — F17.200 SMOKER: ICD-10-CM

## 2023-09-14 DIAGNOSIS — Z87.891 PERSONAL HISTORY OF TOBACCO USE: ICD-10-CM

## 2023-09-14 DIAGNOSIS — Z12.5 SCREENING FOR PROSTATE CANCER: ICD-10-CM

## 2023-09-14 DIAGNOSIS — Z11.59 NEED FOR HEPATITIS C SCREENING TEST: ICD-10-CM

## 2023-09-14 LAB
ALBUMIN SERPL-MCNC: 4.5 G/DL (ref 3.5–5.2)
ALP SERPL-CCNC: 54 U/L (ref 40–130)
ALT SERPL-CCNC: 17 U/L (ref 5–41)
ANION GAP SERPL CALCULATED.3IONS-SCNC: 15 MMOL/L (ref 7–19)
AST SERPL-CCNC: 19 U/L (ref 5–40)
BASOPHILS # BLD: 0 K/UL (ref 0–0.2)
BASOPHILS NFR BLD: 0.6 % (ref 0–1)
BILIRUB SERPL-MCNC: 0.3 MG/DL (ref 0.2–1.2)
BUN SERPL-MCNC: 16 MG/DL (ref 8–23)
CALCIUM SERPL-MCNC: 9.3 MG/DL (ref 8.8–10.2)
CHLORIDE SERPL-SCNC: 108 MMOL/L (ref 98–111)
CHOLEST SERPL-MCNC: 226 MG/DL (ref 160–199)
CO2 SERPL-SCNC: 21 MMOL/L (ref 22–29)
CREAT SERPL-MCNC: 1 MG/DL (ref 0.5–1.2)
EOSINOPHIL # BLD: 0.2 K/UL (ref 0–0.6)
EOSINOPHIL NFR BLD: 3.2 % (ref 0–5)
ERYTHROCYTE [DISTWIDTH] IN BLOOD BY AUTOMATED COUNT: 12.3 % (ref 11.5–14.5)
GLUCOSE SERPL-MCNC: 95 MG/DL (ref 74–109)
HCT VFR BLD AUTO: 43.5 % (ref 42–52)
HCV AB SERPL QL IA: NORMAL
HDLC SERPL-MCNC: 55 MG/DL (ref 55–121)
HGB BLD-MCNC: 14.3 G/DL (ref 14–18)
HIV-1 P24 AG: NORMAL
HIV1+2 AB SERPLBLD QL IA.RAPID: NORMAL
IMM GRANULOCYTES # BLD: 0 K/UL
LDLC SERPL CALC-MCNC: 128 MG/DL
LYMPHOCYTES # BLD: 2.2 K/UL (ref 1.1–4.5)
LYMPHOCYTES NFR BLD: 32.7 % (ref 20–40)
MCH RBC QN AUTO: 32.1 PG (ref 27–31)
MCHC RBC AUTO-ENTMCNC: 32.9 G/DL (ref 33–37)
MCV RBC AUTO: 97.5 FL (ref 80–94)
MONOCYTES # BLD: 0.7 K/UL (ref 0–0.9)
MONOCYTES NFR BLD: 9.8 % (ref 0–10)
NEUTROPHILS # BLD: 3.7 K/UL (ref 1.5–7.5)
NEUTS SEG NFR BLD: 53.4 % (ref 50–65)
PLATELET # BLD AUTO: 208 K/UL (ref 130–400)
PMV BLD AUTO: 9.9 FL (ref 9.4–12.4)
POTASSIUM SERPL-SCNC: 4.8 MMOL/L (ref 3.5–5)
PROT SERPL-MCNC: 7 G/DL (ref 6.6–8.7)
PSA SERPL-MCNC: 1.19 NG/ML (ref 0–4)
RBC # BLD AUTO: 4.46 M/UL (ref 4.7–6.1)
SODIUM SERPL-SCNC: 144 MMOL/L (ref 136–145)
T4 FREE SERPL-MCNC: 1.1 NG/DL (ref 0.93–1.7)
TRIGL SERPL-MCNC: 216 MG/DL (ref 0–149)
TSH SERPL DL<=0.005 MIU/L-ACNC: 1.93 UIU/ML (ref 0.27–4.2)
URATE SERPL-MCNC: 6.5 MG/DL (ref 3.4–7)
WBC # BLD AUTO: 6.9 K/UL (ref 4.8–10.8)

## 2023-09-14 ASSESSMENT — LIFESTYLE VARIABLES
HOW OFTEN DURING THE LAST YEAR HAVE YOU FAILED TO DO WHAT WAS NORMALLY EXPECTED FROM YOU BECAUSE OF DRINKING: 0
HOW OFTEN DURING THE LAST YEAR HAVE YOU NEEDED AN ALCOHOLIC DRINK FIRST THING IN THE MORNING TO GET YOURSELF GOING AFTER A NIGHT OF HEAVY DRINKING: 0
HAS A RELATIVE, FRIEND, DOCTOR, OR ANOTHER HEALTH PROFESSIONAL EXPRESSED CONCERN ABOUT YOUR DRINKING OR SUGGESTED YOU CUT DOWN: 0
HOW OFTEN DURING THE LAST YEAR HAVE YOU BEEN UNABLE TO REMEMBER WHAT HAPPENED THE NIGHT BEFORE BECAUSE YOU HAD BEEN DRINKING: 0
HOW OFTEN DO YOU HAVE A DRINK CONTAINING ALCOHOL: 2-3 TIMES A WEEK
HOW OFTEN DURING THE LAST YEAR HAVE YOU FOUND THAT YOU WERE NOT ABLE TO STOP DRINKING ONCE YOU HAD STARTED: 0
HOW OFTEN DURING THE LAST YEAR HAVE YOU HAD A FEELING OF GUILT OR REMORSE AFTER DRINKING: 0
HAVE YOU OR SOMEONE ELSE BEEN INJURED AS A RESULT OF YOUR DRINKING: 0
HOW MANY STANDARD DRINKS CONTAINING ALCOHOL DO YOU HAVE ON A TYPICAL DAY: 5 OR 6

## 2023-09-14 ASSESSMENT — PATIENT HEALTH QUESTIONNAIRE - PHQ9
SUM OF ALL RESPONSES TO PHQ9 QUESTIONS 1 & 2: 0
SUM OF ALL RESPONSES TO PHQ QUESTIONS 1-9: 0
1. LITTLE INTEREST OR PLEASURE IN DOING THINGS: 0
2. FEELING DOWN, DEPRESSED OR HOPELESS: 0
SUM OF ALL RESPONSES TO PHQ QUESTIONS 1-9: 0

## 2023-09-14 NOTE — PATIENT INSTRUCTIONS
questions about a medical condition or this instruction, always ask your healthcare professional. [address] any warranty or liability for your use of this information. Starting a Weight Loss Plan: Care Instructions  Overview     If you're thinking about losing weight, it can be hard to know where to start. Your doctor can help you set up a weight loss plan that best meets your needs. You may want to take a class on nutrition or exercise, or you could join a weight loss support group. If you have questions about how to make changes to your eating or exercise habits, ask your doctor about seeing a registered dietitian or an exercise specialist.  It can be a big challenge to lose weight. But you don't have to make huge changes at once. Make small changes, and stick with them. When those changes become habit, add a few more changes. If you don't think you're ready to make changes right now, try to pick a date in the future. Make an appointment to see your doctor to discuss whether the time is right for you to start a plan. Follow-up care is a key part of your treatment and safety. Be sure to make and go to all appointments, and call your doctor if you are having problems. It's also a good idea to know your test results and keep a list of the medicines you take. How can you care for yourself at home? Set realistic goals. Many people expect to lose much more weight than is likely. A weight loss of 5% to 10% of your body weight may be enough to improve your health. Get family and friends involved to provide support. Talk to them about why you are trying to lose weight, and ask them to help. They can help by participating in exercise and having meals with you, even if they may be eating something different. Find what works best for you. If you do not have time or do not like to cook, a program that offers meal replacement bars or shakes may be better for you.  Or if you like to

## 2023-09-19 RX ORDER — TRIAMCINOLONE ACETONIDE 1 MG/G
30 OINTMENT TOPICAL 2 TIMES DAILY
Qty: 3 EACH | Refills: 0 | Status: SHIPPED | OUTPATIENT
Start: 2023-09-19 | End: 2023-09-26

## 2023-09-21 RX ORDER — LISINOPRIL 20 MG/1
20 TABLET ORAL DAILY
Qty: 90 TABLET | Refills: 1 | Status: SHIPPED | OUTPATIENT
Start: 2023-09-21

## 2023-09-25 ENCOUNTER — TELEPHONE (OUTPATIENT)
Dept: PRIMARY CARE CLINIC | Age: 63
End: 2023-09-25

## 2023-09-25 RX ORDER — TRIAMCINOLONE ACETONIDE 1 MG/G
OINTMENT TOPICAL
Qty: 15 G | Refills: 0 | Status: SHIPPED | OUTPATIENT
Start: 2023-09-25

## 2023-10-04 ENCOUNTER — TELEPHONE (OUTPATIENT)
Dept: PRIMARY CARE CLINIC | Age: 63
End: 2023-10-04

## 2023-10-04 NOTE — TELEPHONE ENCOUNTER
Pt states he has a CT Chest scheduled 10/12/23. Pt would like xrays of his knees and hips the same day. Pt states he is on disability with years of working and feels arthritis related however would like to know for sure.

## 2023-10-12 ENCOUNTER — HOSPITAL ENCOUNTER (OUTPATIENT)
Dept: CT IMAGING | Age: 63
Discharge: HOME | End: 2023-10-12
Payer: MEDICARE

## 2023-10-12 DIAGNOSIS — F17.200 SMOKER: ICD-10-CM

## 2023-10-12 DIAGNOSIS — Z87.891 PERSONAL HISTORY OF TOBACCO USE: ICD-10-CM

## 2023-10-12 PROCEDURE — 71271 CT THORAX LUNG CANCER SCR C-: CPT

## 2023-10-13 ENCOUNTER — TELEPHONE (OUTPATIENT)
Dept: GASTROENTEROLOGY | Age: 63
End: 2023-10-13

## 2023-10-13 ENCOUNTER — TELEPHONE (OUTPATIENT)
Dept: PRIMARY CARE CLINIC | Age: 63
End: 2023-10-13

## 2023-10-13 ENCOUNTER — OFFICE VISIT (OUTPATIENT)
Dept: PRIMARY CARE CLINIC | Age: 63
End: 2023-10-13
Payer: MEDICARE

## 2023-10-13 VITALS
HEART RATE: 97 BPM | WEIGHT: 242.6 LBS | SYSTOLIC BLOOD PRESSURE: 140 MMHG | RESPIRATION RATE: 18 BRPM | DIASTOLIC BLOOD PRESSURE: 94 MMHG | BODY MASS INDEX: 32.86 KG/M2 | OXYGEN SATURATION: 98 % | HEIGHT: 72 IN | TEMPERATURE: 97.8 F

## 2023-10-13 DIAGNOSIS — M25.561 CHRONIC PAIN OF BOTH KNEES: Primary | ICD-10-CM

## 2023-10-13 DIAGNOSIS — M25.562 CHRONIC PAIN OF BOTH KNEES: Primary | ICD-10-CM

## 2023-10-13 DIAGNOSIS — I10 HYPERTENSION, UNSPECIFIED TYPE: ICD-10-CM

## 2023-10-13 DIAGNOSIS — G89.29 CHRONIC PAIN OF BOTH KNEES: Primary | ICD-10-CM

## 2023-10-13 DIAGNOSIS — E66.9 CLASS 1 OBESITY WITH SERIOUS COMORBIDITY AND BODY MASS INDEX (BMI) OF 32.0 TO 32.9 IN ADULT, UNSPECIFIED OBESITY TYPE: ICD-10-CM

## 2023-10-13 DIAGNOSIS — M25.551 PAIN OF RIGHT HIP: ICD-10-CM

## 2023-10-13 PROCEDURE — 3017F COLORECTAL CA SCREEN DOC REV: CPT | Performed by: NURSE PRACTITIONER

## 2023-10-13 PROCEDURE — 3074F SYST BP LT 130 MM HG: CPT | Performed by: NURSE PRACTITIONER

## 2023-10-13 PROCEDURE — G8417 CALC BMI ABV UP PARAM F/U: HCPCS | Performed by: NURSE PRACTITIONER

## 2023-10-13 PROCEDURE — 99214 OFFICE O/P EST MOD 30 MIN: CPT | Performed by: NURSE PRACTITIONER

## 2023-10-13 PROCEDURE — G8484 FLU IMMUNIZE NO ADMIN: HCPCS | Performed by: NURSE PRACTITIONER

## 2023-10-13 PROCEDURE — 4004F PT TOBACCO SCREEN RCVD TLK: CPT | Performed by: NURSE PRACTITIONER

## 2023-10-13 PROCEDURE — 96372 THER/PROPH/DIAG INJ SC/IM: CPT | Performed by: NURSE PRACTITIONER

## 2023-10-13 PROCEDURE — G8427 DOCREV CUR MEDS BY ELIG CLIN: HCPCS | Performed by: NURSE PRACTITIONER

## 2023-10-13 PROCEDURE — 3078F DIAST BP <80 MM HG: CPT | Performed by: NURSE PRACTITIONER

## 2023-10-13 RX ORDER — METHYLPREDNISOLONE ACETATE 40 MG/ML
40 INJECTION, SUSPENSION INTRA-ARTICULAR; INTRALESIONAL; INTRAMUSCULAR; SOFT TISSUE ONCE
Qty: 1 ML | Refills: 0 | Status: SHIPPED
Start: 2023-10-13 | End: 2023-10-16 | Stop reason: CLARIF

## 2023-10-13 RX ORDER — KETOROLAC TROMETHAMINE 30 MG/ML
60 INJECTION, SOLUTION INTRAMUSCULAR; INTRAVENOUS ONCE
Status: COMPLETED | OUTPATIENT
Start: 2023-10-13 | End: 2023-10-13

## 2023-10-13 RX ORDER — KETOROLAC TROMETHAMINE 10 MG/1
10 TABLET, FILM COATED ORAL EVERY 6 HOURS PRN
Qty: 20 TABLET | Refills: 0 | Status: SHIPPED | OUTPATIENT
Start: 2023-10-13 | End: 2024-10-12

## 2023-10-13 RX ORDER — TIZANIDINE 4 MG/1
4 TABLET ORAL 3 TIMES DAILY PRN
Qty: 30 TABLET | Refills: 0 | Status: SHIPPED | OUTPATIENT
Start: 2023-10-13

## 2023-10-13 RX ORDER — NEBIVOLOL 5 MG/1
5 TABLET ORAL DAILY
Qty: 30 TABLET | Refills: 0 | Status: SHIPPED | OUTPATIENT
Start: 2023-10-13

## 2023-10-13 RX ADMIN — KETOROLAC TROMETHAMINE 60 MG: 30 INJECTION, SOLUTION INTRAMUSCULAR; INTRAVENOUS at 12:08

## 2023-10-13 NOTE — TELEPHONE ENCOUNTER
----- Message from LILLIE Washburn NP sent at 10/13/2023 11:44 AM CDT -----  Can you check on his referral to gi for colonoscopy?

## 2023-10-13 NOTE — TELEPHONE ENCOUNTER
[address] [place] [address] to follow up on referral. They did say they were behind on colonoscopy's due to people being out of the office. A message was sent to the ones in charge to scheduling those to follow up. They will call patient to schedule.

## 2023-10-13 NOTE — TELEPHONE ENCOUNTER
[facility] office called to check status on this OA CLN. Angelika Carvajal said their pt form should be in [facility] or [facility].

## 2023-10-16 PROBLEM — M25.551 PAIN OF RIGHT HIP: Status: ACTIVE | Noted: 2023-10-16

## 2023-10-16 PROBLEM — E66.811 CLASS 1 OBESITY WITH SERIOUS COMORBIDITY AND BODY MASS INDEX (BMI) OF 32.0 TO 32.9 IN ADULT: Status: ACTIVE | Noted: 2023-10-16

## 2023-10-16 PROBLEM — E66.9 CLASS 1 OBESITY WITH SERIOUS COMORBIDITY AND BODY MASS INDEX (BMI) OF 32.0 TO 32.9 IN ADULT: Status: ACTIVE | Noted: 2023-10-16

## 2023-10-16 ASSESSMENT — ENCOUNTER SYMPTOMS
SHORTNESS OF BREATH: 0
WHEEZING: 0
EYES NEGATIVE: 1
GASTROINTESTINAL NEGATIVE: 1
COUGH: 0
ABDOMINAL PAIN: 0
BACK PAIN: 1
RESPIRATORY NEGATIVE: 1
ALLERGIC/IMMUNOLOGIC NEGATIVE: 1

## 2023-11-21 ENCOUNTER — ANESTHESIA EVENT (OUTPATIENT)
Dept: OPERATING ROOM | Age: 63
End: 2023-11-21

## 2023-11-21 ENCOUNTER — TELEPHONE (OUTPATIENT)
Dept: GASTROENTEROLOGY | Age: 63
End: 2023-11-21

## 2023-11-21 ENCOUNTER — ANESTHESIA (OUTPATIENT)
Dept: OPERATING ROOM | Age: 63
End: 2023-11-21

## 2023-11-21 ENCOUNTER — APPOINTMENT (OUTPATIENT)
Dept: OPERATING ROOM | Age: 63
End: 2023-11-21
Attending: INTERNAL MEDICINE

## 2023-11-21 ENCOUNTER — HOSPITAL ENCOUNTER (OUTPATIENT)
Age: 63
Setting detail: OUTPATIENT SURGERY
Discharge: HOME | End: 2023-11-21
Attending: INTERNAL MEDICINE | Admitting: INTERNAL MEDICINE
Payer: MEDICARE

## 2023-11-21 VITALS
RESPIRATION RATE: 18 BRPM | HEIGHT: 72 IN | TEMPERATURE: 98 F | BODY MASS INDEX: 32.51 KG/M2 | OXYGEN SATURATION: 95 % | HEART RATE: 87 BPM | DIASTOLIC BLOOD PRESSURE: 79 MMHG | SYSTOLIC BLOOD PRESSURE: 129 MMHG | WEIGHT: 240 LBS

## 2023-11-21 PROCEDURE — G0121 COLON CA SCRN NOT HI RSK IND: HCPCS | Performed by: INTERNAL MEDICINE

## 2023-11-21 PROCEDURE — G0105 COLORECTAL SCRN; HI RISK IND: HCPCS

## 2023-11-21 RX ORDER — PROPOFOL 10 MG/ML
INJECTION, EMULSION INTRAVENOUS PRN
Status: DISCONTINUED | OUTPATIENT
Start: 2023-11-21 | End: 2023-11-21 | Stop reason: SDUPTHER

## 2023-11-21 RX ORDER — SODIUM CHLORIDE, SODIUM LACTATE, POTASSIUM CHLORIDE, CALCIUM CHLORIDE 600; 310; 30; 20 MG/100ML; MG/100ML; MG/100ML; MG/100ML
INJECTION, SOLUTION INTRAVENOUS CONTINUOUS PRN
Status: DISCONTINUED | OUTPATIENT
Start: 2023-11-21 | End: 2023-11-21 | Stop reason: SDUPTHER

## 2023-11-21 RX ORDER — LIDOCAINE HYDROCHLORIDE 10 MG/ML
INJECTION, SOLUTION EPIDURAL; INFILTRATION; INTRACAUDAL; PERINEURAL PRN
Status: DISCONTINUED | OUTPATIENT
Start: 2023-11-21 | End: 2023-11-21 | Stop reason: SDUPTHER

## 2023-11-21 RX ORDER — ONDANSETRON 4 MG/1
TABLET, FILM COATED ORAL
Qty: 15 TABLET | Refills: 0 | Status: SHIPPED | OUTPATIENT
Start: 2023-11-21

## 2023-11-21 RX ORDER — SODIUM CHLORIDE, SODIUM LACTATE, POTASSIUM CHLORIDE, CALCIUM CHLORIDE 600; 310; 30; 20 MG/100ML; MG/100ML; MG/100ML; MG/100ML
INJECTION, SOLUTION INTRAVENOUS CONTINUOUS
Status: DISCONTINUED | OUTPATIENT
Start: 2023-11-21 | End: 2023-11-21 | Stop reason: HOSPADM

## 2023-11-21 RX ADMIN — LIDOCAINE HYDROCHLORIDE 50 MG: 10 INJECTION, SOLUTION EPIDURAL; INFILTRATION; INTRACAUDAL; PERINEURAL at 08:55

## 2023-11-21 RX ADMIN — SODIUM CHLORIDE, SODIUM LACTATE, POTASSIUM CHLORIDE, CALCIUM CHLORIDE: 600; 310; 30; 20 INJECTION, SOLUTION INTRAVENOUS at 08:46

## 2023-11-21 RX ADMIN — PROPOFOL 250 MG: 10 INJECTION, EMULSION INTRAVENOUS at 08:55

## 2023-11-21 RX ADMIN — SODIUM CHLORIDE, SODIUM LACTATE, POTASSIUM CHLORIDE, CALCIUM CHLORIDE: 600; 310; 30; 20 INJECTION, SOLUTION INTRAVENOUS at 07:29

## 2023-11-21 ASSESSMENT — PAIN SCALES - GENERAL: PAINLEVEL_OUTOF10: 0

## 2023-11-21 ASSESSMENT — PAIN - FUNCTIONAL ASSESSMENT: PAIN_FUNCTIONAL_ASSESSMENT: NONE - DENIES PAIN

## 2023-11-21 NOTE — DISCHARGE INSTRUCTIONS
1. Repeat colonoscopy:  -Tomorrow after a strict clear liquid diet today until midnight and a repeat prep with a solution like Plenvu starting this afternoon aided by Reglan 10 mg Zofran 4 mg p.o. every 4 hours as needed  2. - Resume previous meds  - GI clinic f/u PRN   - Keep scheduled f/u appts with other MDs

## 2023-11-21 NOTE — OP NOTE
Patient: Brayan Perez :   [facility] Rec#: [id number] Acc#: [id number]   Primary Care Provider LILLIE Washburn NP    Date of Procedure:  2023    Endoscopist: Rudine Hatchet, MD, MD    Referring Provider: LILLIE Washburn NP,     Operation Performed: Colonoscopy up to the descending colon only before procedure to be terminated due to poor prep. Indications: Colon cancer screening    Anesthesia:  Sedation was administered by anesthesia who monitored the patient during the procedure. I met with Brayan Perez prior to procedure. We discussed the procedure itself, and I have discussed the risks of endoscopy (including-- but not limited to-- pain, discomfort, bleeding potentially requiring second endoscopic procedure and/or blood transfusion, organ perforation requiring operative repair, damage to organs near the colon, infection, aspiration, cardiopulmonary/allergic reaction), benefits, indications to endoscopy. Additionally, we discussed options other than colonoscopy. The patient expressed understanding. All questions answered. The patient decided to proceed with the procedure. Signed informed consent was placed on the chart. Blood Loss: minimal    Withdrawal time: Not applicable since procedure was incomplete  Bowel Prep: Fair to poor with thick solid and semisolid stool scattered in segments throughout the colon obscuring the underlying mucosa. Lesions including polyps may have been missed. Complications: no immediate complications    DESCRIPTION OF PROCEDURE:     A time out was performed. After written informed consent was obtained, the patient was placed in the left lateral position. The perianal area was inspected, and a digital rectal exam was performed. A rectal exam was performed: normal tone, no palpable lesions.  At this point, a forward viewing Olympus colonoscope was inserted into the anus and carefully advanced to the descending colon only before

## 2023-11-21 NOTE — H&P
Patient Name: Alcira Lozano  : 1960  MRN: [id number]  DATE: 23    Allergies: No Known Allergies     ENDOSCOPY  History and Physical    Procedure:    [] Diagnostic Colonoscopy       [x] Screening Colonoscopy  [] EGD      [] ERCP      [] EUS       [] Other    [x] Previous office notes/History and Physical reviewed from the patients chart. Please see EMR for further details of HPI. I have examined the patient's status immediately prior to the procedure and:      Indications/HPI:    []Abdominal Pain   []Cancer- GI/Lung     []Fhx of colon CA/polyps  []History of Polyps  []Barretts            []Melena  []Abnormal Imaging              []Dysphagia              []Persistent Pneumonia   []Anemia                            []Food Impaction        []History of Polyps  [] GI Bleed             []Pulmonary nodule/Mass   []Change in bowel habits []Heartburn/Reflux  []Rectal Bleed (BRBPR)  []Chest Pain - Non Cardiac []Heme (+) Stool []Ulcers  []Constipation  []Hemoptysis  []Varices  []Diarrhea  []Hypoxemia    []Nausea/Vomiting   [x]Screening   []Crohns/Colitis  []Other:     Anesthesia:   [x] MAC [] Moderate Sedation   [] General   [] None     ROS: 12 pt Review of Symptoms was negative unless mentioned above    Medications:   Prior to Admission medications    Medication Sig Start Date End Date Taking? Authorizing Provider   nebivolol (BYSTOLIC) 5 MG tablet Take 1 tablet by mouth daily 10/13/23   Jeremy Corns, APRN - NP   ketorolac (TORADOL) 10 MG tablet Take 1 tablet by mouth every 6 hours as needed for Pain 10/13/23 10/12/24  Jeremy Corns, APRN - NP   tiZANidine (ZANAFLEX) 4 MG tablet Take 1 tablet by mouth 3 times daily as needed (muscle spasm) 10/13/23   Jeremy Corns, APRN - NP   triamcinolone (KENALOG) 0.1 % ointment Apply 1/4 inch bead of ointment to open wound twice a day.   Patient not taking: Reported on 10/13/2023 9/25/23   Jeremy Corns, APRN - NP   lisinopril (PRINIVIL;ZESTRIL) 20 MG tablet Take 1

## 2023-11-21 NOTE — ANESTHESIA POSTPROCEDURE EVALUATION
Department of Anesthesiology  Postprocedure Note    Patient: Asia Thomas  MRN: [id number]  YOB: 1960  Date of evaluation: 11/21/2023      Procedure Summary     Date: 11/21/23 Room / Location: [facility] ENDO 02 / [address]    Anesthesia Start: 4422 Anesthesia Stop: 0432    Procedure: [address], NOT HIGH RISK (Abdomen) Diagnosis:       Screen for colon cancer      (Screen for colon cancer [Z12.11])    Surgeons: Juliette Hamilton MD Responsible Provider: LILLIE iLcea CRNA    Anesthesia Type: general ASA Status: 2          Anesthesia Type: No value filed.     Flor Phase I:      Flor Phase II:        Anesthesia Post Evaluation    Patient location during evaluation: bedside  Patient participation: complete - patient participated  Level of consciousness: awake and alert  Pain score: 0  Airway patency: patent  Nausea & Vomiting: no nausea  Complications: no  Cardiovascular status: hemodynamically stable  Respiratory status: acceptable  Hydration status: euvolemic

## 2023-11-22 ENCOUNTER — ANESTHESIA (OUTPATIENT)
Dept: OPERATING ROOM | Age: 63
End: 2023-11-22

## 2023-11-22 ENCOUNTER — HOSPITAL ENCOUNTER (OUTPATIENT)
Age: 63
Setting detail: SPECIMEN
Discharge: HOME | End: 2023-11-22

## 2023-11-22 ENCOUNTER — HOSPITAL ENCOUNTER (OUTPATIENT)
Age: 63
Setting detail: OUTPATIENT SURGERY
Discharge: HOME | End: 2023-11-22
Attending: INTERNAL MEDICINE | Admitting: INTERNAL MEDICINE
Payer: MEDICARE

## 2023-11-22 ENCOUNTER — ANESTHESIA EVENT (OUTPATIENT)
Dept: OPERATING ROOM | Age: 63
End: 2023-11-22

## 2023-11-22 ENCOUNTER — APPOINTMENT (OUTPATIENT)
Dept: OPERATING ROOM | Age: 63
End: 2023-11-22
Attending: INTERNAL MEDICINE

## 2023-11-22 VITALS
WEIGHT: 240 LBS | RESPIRATION RATE: 20 BRPM | BODY MASS INDEX: 32.51 KG/M2 | HEIGHT: 72 IN | SYSTOLIC BLOOD PRESSURE: 120 MMHG | TEMPERATURE: 97.8 F | OXYGEN SATURATION: 97 % | HEART RATE: 71 BPM | DIASTOLIC BLOOD PRESSURE: 68 MMHG

## 2023-11-22 PROCEDURE — 45385 COLONOSCOPY W/LESION REMOVAL: CPT | Performed by: INTERNAL MEDICINE

## 2023-11-22 PROCEDURE — G8907 PT DOC NO EVENTS ON DISCHARG: HCPCS

## 2023-11-22 PROCEDURE — G8918 PT W/O PREOP ORDER IV AB PRO: HCPCS

## 2023-11-22 PROCEDURE — 45385 COLONOSCOPY W/LESION REMOVAL: CPT

## 2023-11-22 RX ORDER — LIDOCAINE HYDROCHLORIDE 10 MG/ML
INJECTION, SOLUTION EPIDURAL; INFILTRATION; INTRACAUDAL; PERINEURAL PRN
Status: DISCONTINUED | OUTPATIENT
Start: 2023-11-22 | End: 2023-11-22 | Stop reason: SDUPTHER

## 2023-11-22 RX ORDER — SODIUM CHLORIDE, SODIUM LACTATE, POTASSIUM CHLORIDE, CALCIUM CHLORIDE 600; 310; 30; 20 MG/100ML; MG/100ML; MG/100ML; MG/100ML
INJECTION, SOLUTION INTRAVENOUS CONTINUOUS
Status: DISCONTINUED | OUTPATIENT
Start: 2023-11-22 | End: 2023-11-22 | Stop reason: HOSPADM

## 2023-11-22 RX ORDER — PROPOFOL 10 MG/ML
INJECTION, EMULSION INTRAVENOUS PRN
Status: DISCONTINUED | OUTPATIENT
Start: 2023-11-22 | End: 2023-11-22 | Stop reason: SDUPTHER

## 2023-11-22 RX ADMIN — PROPOFOL 600 MG: 10 INJECTION, EMULSION INTRAVENOUS at 11:09

## 2023-11-22 RX ADMIN — SODIUM CHLORIDE, SODIUM LACTATE, POTASSIUM CHLORIDE, CALCIUM CHLORIDE: 600; 310; 30; 20 INJECTION, SOLUTION INTRAVENOUS at 09:46

## 2023-11-22 RX ADMIN — LIDOCAINE HYDROCHLORIDE 30 MG: 10 INJECTION, SOLUTION EPIDURAL; INFILTRATION; INTRACAUDAL; PERINEURAL at 11:09

## 2023-11-22 ASSESSMENT — PAIN - FUNCTIONAL ASSESSMENT: PAIN_FUNCTIONAL_ASSESSMENT: NONE - DENIES PAIN

## 2023-11-22 NOTE — ANESTHESIA POSTPROCEDURE EVALUATION
Department of Anesthesiology  Postprocedure Note    Patient: Roxanne More  MRN: [id number]  YOB: 1960  Date of evaluation: 11/22/2023      Procedure Summary     Date: 11/22/23 Room / Location: [facility] 02 / [address]    Anesthesia Start: 1107 Anesthesia Stop:     Procedure: COLORECTAL CANCER SCREENING, NOT HIGH RISK (Abdomen) Diagnosis:       Screen for colon cancer      (Screen for colon cancer [Z12.11])    Surgeons: Stacy Lujan MD Responsible Provider:     Anesthesia Type: general, TIVA ASA Status: 2          Anesthesia Type: No value filed.     Flor Phase I:      Flor Phase II:        Anesthesia Post Evaluation    Patient location during evaluation: bedside  Patient participation: complete - patient participated  Level of consciousness: sleepy but conscious  Pain score: 0  Airway patency: patent  Nausea & Vomiting: no nausea and no vomiting  Complications: no  Cardiovascular status: blood pressure returned to baseline  Respiratory status: acceptable, room air and spontaneous ventilation  Hydration status: euvolemic  Pain management: adequate

## 2023-11-22 NOTE — DISCHARGE INSTRUCTIONS
1. Repeat colonoscopy: pending pathology -in 5 years with a strict 2-day clear liquid diet and a 2-day prep using Plenvu or a similar solution; sooner if hispersonal or family history as pertaining to colorectal cancer risk changes requiring an earlier exam or if the patient were to develop lower GI symptoms such as bleeding, abdominal pain, change in bowel habits or stool caliber or if the patient has anemia or unexplained weight loss in the future. 2. Await biopsy results-you will receive a letter with your results within 7-10 days    - Resume previous meds and diet  - GI clinic f/u PRN   - Keep scheduled f/u appts with other MDs     - NO ASA/NSAIDs x 2 weeks     NSAIDS Non-steroidal Anti-Inflammatory  You have been directed by your physician to avoid any NSAID's; the following medications are a list of those to avoid. If you think that you are taking any NSAID's notify your physician. Over The Counter  Advil                      Motrin  Nuprin                   Ibuprofen  Midol                     Aleve  Naproxen              Orudis  Aspirin                   [place]  Prescriptions and Generics  Cataflam              Relafen  Voltaren               Clinoril  Indocin                 Naproxen  Arthrotec              Lodine  Daypro                 Nalfon  Toradol                Ansaid  Feldene               Meclofenamate  Fenoprofen          Ponstel  Mobic                   Celebrex      POST-OP ORDERS: ENDOSCOPY & COLONOSCOPY:    1. Rest today. 2. DO NOT eat or drink until wide awake; eat your usual diet today in moderate amount only. 3. DO NOT drive today. 4. Call physician if you have severe pain, vomiting, fever, rectal bleeding or black bowel movements. 5.  If a biopsy was taken or a polyp removed, you should expect to hear results in about 7-10 days. If you have heard nothing from your physician by then, call the office for results.     6.  Discharge home when patient awake, vitals signs stable and tolerating liquids. 7. Call with questions or concerns [phone].

## 2023-11-22 NOTE — OP NOTE
Patient: Roxanne More :   [facility] Rec#: [id number] Acc#: [id number]   Primary Care Provider LILLIE Mccallum NP    Date of Procedure:  2023    Endoscopist: Stacy Lujan MD, MD    Referring Provider: LILLIE Mccallum NP,     Operation Performed: Colonoscopy up to the cecum with hot snare resection of polyps    Indications: Colon cancer screening; colonoscopy attempt yesterday was inadequate and incomplete due to poor prep requiring this repeat exam today after a repeat prep    Anesthesia:  Sedation was administered by anesthesia who monitored the patient during the procedure. I met with Roxanne More prior to procedure. We discussed the procedure itself, and I have discussed the risks of endoscopy (including-- but not limited to-- pain, discomfort, bleeding potentially requiring second endoscopic procedure and/or blood transfusion, organ perforation requiring operative repair, damage to organs near the colon, infection, aspiration, cardiopulmonary/allergic reaction), benefits, indications to endoscopy. Additionally, we discussed options other than colonoscopy. The patient expressed understanding. All questions answered. The patient decided to proceed with the procedure. Signed informed consent was placed on the chart. Blood Loss: minimal    Withdrawal time: More than 10 minutes  Bowel Prep:Fair  with small amounts of thick semisolid stool and a moderate amount of liquid scattered in patchy segments throughout the colon obscuring the underlying mucosa. Lesions including polyps may have been missed. Complications: no immediate complications    DESCRIPTION OF PROCEDURE:     A time out was performed. After written informed consent was obtained, the patient was placed in the left lateral position. The perianal area was inspected, and a digital rectal exam was performed. A rectal exam was performed: normal tone, no palpable lesions.  At this point, a forward viewing

## 2023-12-07 RX ORDER — NEBIVOLOL 5 MG/1
5 TABLET ORAL DAILY
Qty: 30 TABLET | Refills: 5 | Status: SHIPPED | OUTPATIENT
Start: 2023-12-07

## 2024-01-17 RX ORDER — LISINOPRIL 20 MG/1
20 TABLET ORAL DAILY
Qty: 90 TABLET | Refills: 1 | Status: SHIPPED | OUTPATIENT
Start: 2024-01-17

## 2024-01-17 RX ORDER — NEBIVOLOL 5 MG/1
5 TABLET ORAL DAILY
Qty: 90 TABLET | Refills: 1 | Status: SHIPPED | OUTPATIENT
Start: 2024-01-17

## 2024-03-14 ENCOUNTER — OFFICE VISIT (OUTPATIENT)
Dept: PRIMARY CARE CLINIC | Age: 64
End: 2024-03-14
Payer: MEDICARE

## 2024-03-14 VITALS
HEART RATE: 90 BPM | HEIGHT: 72 IN | WEIGHT: 246.8 LBS | OXYGEN SATURATION: 98 % | TEMPERATURE: 97.5 F | DIASTOLIC BLOOD PRESSURE: 86 MMHG | SYSTOLIC BLOOD PRESSURE: 138 MMHG | RESPIRATION RATE: 18 BRPM | BODY MASS INDEX: 33.43 KG/M2

## 2024-03-14 DIAGNOSIS — M25.551 PAIN OF RIGHT HIP: ICD-10-CM

## 2024-03-14 DIAGNOSIS — M25.561 CHRONIC PAIN OF BOTH KNEES: Primary | ICD-10-CM

## 2024-03-14 DIAGNOSIS — M25.531 RIGHT WRIST PAIN: ICD-10-CM

## 2024-03-14 DIAGNOSIS — G89.29 CHRONIC PAIN OF BOTH KNEES: Primary | ICD-10-CM

## 2024-03-14 DIAGNOSIS — M25.562 CHRONIC PAIN OF BOTH KNEES: Primary | ICD-10-CM

## 2024-03-14 LAB
ALBUMIN SERPL-MCNC: 4.6 G/DL (ref 3.5–5.2)
ALP SERPL-CCNC: 53 U/L (ref 40–130)
ALT SERPL-CCNC: 19 U/L (ref 5–41)
ANION GAP SERPL CALCULATED.3IONS-SCNC: 14 MMOL/L (ref 7–19)
AST SERPL-CCNC: 17 U/L (ref 5–40)
BILIRUB SERPL-MCNC: 0.4 MG/DL (ref 0.2–1.2)
BUN SERPL-MCNC: 21 MG/DL (ref 8–23)
CALCIUM SERPL-MCNC: 9.7 MG/DL (ref 8.8–10.2)
CHLORIDE SERPL-SCNC: 103 MMOL/L (ref 98–111)
CO2 SERPL-SCNC: 25 MMOL/L (ref 22–29)
CREAT SERPL-MCNC: 1.2 MG/DL (ref 0.5–1.2)
CRP SERPL HS-MCNC: 0.4 MG/DL (ref 0–0.5)
ERYTHROCYTE [SEDIMENTATION RATE] IN BLOOD BY WESTERGREN METHOD: 4 MM/HR (ref 0–15)
GLUCOSE SERPL-MCNC: 110 MG/DL (ref 74–109)
POTASSIUM SERPL-SCNC: 4.7 MMOL/L (ref 3.5–5)
PROT SERPL-MCNC: 7.5 G/DL (ref 6.6–8.7)
RHEUMATOID FACT SER NEPH-ACNC: <10 IU/ML
SODIUM SERPL-SCNC: 142 MMOL/L (ref 136–145)

## 2024-03-14 PROCEDURE — 3017F COLORECTAL CA SCREEN DOC REV: CPT | Performed by: NURSE PRACTITIONER

## 2024-03-14 PROCEDURE — 3075F SYST BP GE 130 - 139MM HG: CPT | Performed by: NURSE PRACTITIONER

## 2024-03-14 PROCEDURE — 99214 OFFICE O/P EST MOD 30 MIN: CPT | Performed by: NURSE PRACTITIONER

## 2024-03-14 PROCEDURE — G8427 DOCREV CUR MEDS BY ELIG CLIN: HCPCS | Performed by: NURSE PRACTITIONER

## 2024-03-14 PROCEDURE — 3079F DIAST BP 80-89 MM HG: CPT | Performed by: NURSE PRACTITIONER

## 2024-03-14 PROCEDURE — 4004F PT TOBACCO SCREEN RCVD TLK: CPT | Performed by: NURSE PRACTITIONER

## 2024-03-14 PROCEDURE — G8417 CALC BMI ABV UP PARAM F/U: HCPCS | Performed by: NURSE PRACTITIONER

## 2024-03-14 PROCEDURE — 96372 THER/PROPH/DIAG INJ SC/IM: CPT | Performed by: NURSE PRACTITIONER

## 2024-03-14 PROCEDURE — G8484 FLU IMMUNIZE NO ADMIN: HCPCS | Performed by: NURSE PRACTITIONER

## 2024-03-14 RX ORDER — KETOROLAC TROMETHAMINE 10 MG/1
10 TABLET, FILM COATED ORAL EVERY 6 HOURS PRN
Qty: 20 TABLET | Refills: 0 | Status: SHIPPED | OUTPATIENT
Start: 2024-03-14 | End: 2025-03-14

## 2024-03-14 RX ORDER — KETOROLAC TROMETHAMINE 30 MG/ML
60 INJECTION, SOLUTION INTRAMUSCULAR; INTRAVENOUS ONCE
Status: COMPLETED | OUTPATIENT
Start: 2024-03-14 | End: 2024-03-14

## 2024-03-14 RX ORDER — PREDNISONE 10 MG/1
TABLET ORAL
Qty: 18 TABLET | Refills: 0 | Status: SHIPPED | OUTPATIENT
Start: 2024-03-14

## 2024-03-14 RX ADMIN — KETOROLAC TROMETHAMINE 60 MG: 30 INJECTION, SOLUTION INTRAMUSCULAR; INTRAVENOUS at 08:43

## 2024-03-14 ASSESSMENT — ENCOUNTER SYMPTOMS
COUGH: 0
RESPIRATORY NEGATIVE: 1
GASTROINTESTINAL NEGATIVE: 1
ALLERGIC/IMMUNOLOGIC NEGATIVE: 1
WHEEZING: 0
EYES NEGATIVE: 1
SHORTNESS OF BREATH: 0

## 2024-03-14 ASSESSMENT — PATIENT HEALTH QUESTIONNAIRE - PHQ9
1. LITTLE INTEREST OR PLEASURE IN DOING THINGS: 0
SUM OF ALL RESPONSES TO PHQ QUESTIONS 1-9: 0
SUM OF ALL RESPONSES TO PHQ9 QUESTIONS 1 & 2: 0
SUM OF ALL RESPONSES TO PHQ QUESTIONS 1-9: 0
2. FEELING DOWN, DEPRESSED OR HOPELESS: 0

## 2024-03-14 NOTE — PROGRESS NOTES
After obtaining consent, and per orders of LILLIE Plascencia, injection of Toradol 60 mg given in Right Ventrogluteal by Abe Cazares. Patient tolerated well.

## 2024-03-14 NOTE — PROGRESS NOTES
[facility]  [address]  [place] [address]  Dept: [phone]  Dept Fax: [phone]  Loc: [phone]    Sagar Tomas is a 63 y.o. male who presents today for his medical conditions/complaints as noted below.  Sagar Tomas is c/o of 6 Month Follow-Up (Pt is here for 6 month follow up on BP. Pt states his BP has been looking good. Pt states the thumb on his right hand is getting worse and causing a lot of pain. Pt would also like to discuss about his knees and hips. Pt is not fasting. )        HPI:     HPI this 63-year-old male presents today for 6-month follow-up on his blood pressure.  He states that he feels that it has been doing good.  He does report he is having significant more pain in his right thumb and both of his knees and his hip.  Pt worked out this morning . Total fit workout daily. pt states that he is taking his medication daily . Very active , he has been cleaning off some land.   Chief Complaint   Patient presents with   • 6 Month Follow-Up     Pt is here for 6 month follow up on BP. Pt states his BP has been looking good. Pt states the thumb on his right hand is getting worse and causing a lot of pain. Pt would also like to discuss about his knees and hips. Pt is not fasting.      Past Medical History:   Diagnosis Date   • History of kidney stones    • Hypertension    • Osteoarthritis       Past Surgical History:   Procedure Laterality Date   • COLONOSCOPY N/A 11/21/2023    Dr Tai, Severe diverticulosis w patchy spring diverticular erythema in left colon, int hem Gr 1 wo bleeding, suboptimal, repeat tomorrow   • COLONOSCOPY N/A 11/22/2023    Dr Tai, TA (-)Dysplasia, sm being appearing sigmoid colon polyp-lost in area, Severe diverticulosis left colon, int hem Gr 2, 5 year recall           3/14/2024     8:02 AM 11/22/2023    11:49 AM 11/22/2023    11:39 AM 11/22/2023     9:37 AM 11/21/2023     9:25 AM 11/21/2023     9:07 AM

## 2024-03-14 NOTE — PATIENT INSTRUCTIONS
Goal SBP < 140 DBP < 85     Sprain/strain   First 24 hours, use ice to injury site for 15 minutes at a time.  After 48 hours, may alternate ice/heat 15 minutes each.  R.I. C.E. therapy = rest, ice, compression and elevation.  May use ace wrap to injured area for compression.  Use Ibuprofen or other antiinflammatory for pain and inflammation.   If no open skin areas, OTC topical lidocaine such as Icy Hot or capsaicin creams may be applied for pain relief.  Slow stretches of area may help reduce spasms and improve range of motion.   Alternate Voltaren cream with Biofreeze gel to site 2 to 3 times a day .

## 2024-03-15 LAB — NUCLEAR IGG SER QL IA: NORMAL

## 2024-04-16 RX ORDER — LISINOPRIL 20 MG/1
20 TABLET ORAL DAILY
Qty: 90 TABLET | Refills: 1 | Status: SHIPPED | OUTPATIENT
Start: 2024-04-16

## 2024-07-16 RX ORDER — NEBIVOLOL 5 MG/1
5 TABLET ORAL DAILY
Qty: 90 TABLET | Refills: 1 | Status: SHIPPED | OUTPATIENT
Start: 2024-07-16

## 2024-07-16 RX ORDER — LISINOPRIL 20 MG/1
20 TABLET ORAL DAILY
Qty: 90 TABLET | Refills: 1 | Status: SHIPPED | OUTPATIENT
Start: 2024-07-16

## 2024-08-09 ENCOUNTER — OFFICE VISIT (OUTPATIENT)
Dept: PRIMARY CARE CLINIC | Age: 64
End: 2024-08-09
Payer: MEDICARE

## 2024-08-09 VITALS
WEIGHT: 240 LBS | DIASTOLIC BLOOD PRESSURE: 80 MMHG | BODY MASS INDEX: 32.51 KG/M2 | OXYGEN SATURATION: 96 % | HEIGHT: 72 IN | SYSTOLIC BLOOD PRESSURE: 122 MMHG | HEART RATE: 76 BPM | TEMPERATURE: 97.8 F | RESPIRATION RATE: 18 BRPM

## 2024-08-09 DIAGNOSIS — M25.561 CHRONIC PAIN OF BOTH KNEES: ICD-10-CM

## 2024-08-09 DIAGNOSIS — I10 HYPERTENSION, UNSPECIFIED TYPE: Primary | ICD-10-CM

## 2024-08-09 DIAGNOSIS — M25.551 PAIN OF RIGHT HIP: ICD-10-CM

## 2024-08-09 DIAGNOSIS — G89.29 CHRONIC PAIN OF BOTH KNEES: ICD-10-CM

## 2024-08-09 DIAGNOSIS — M25.562 CHRONIC PAIN OF BOTH KNEES: ICD-10-CM

## 2024-08-09 PROCEDURE — G8427 DOCREV CUR MEDS BY ELIG CLIN: HCPCS | Performed by: NURSE PRACTITIONER

## 2024-08-09 PROCEDURE — 3017F COLORECTAL CA SCREEN DOC REV: CPT | Performed by: NURSE PRACTITIONER

## 2024-08-09 PROCEDURE — 99213 OFFICE O/P EST LOW 20 MIN: CPT | Performed by: NURSE PRACTITIONER

## 2024-08-09 PROCEDURE — 4004F PT TOBACCO SCREEN RCVD TLK: CPT | Performed by: NURSE PRACTITIONER

## 2024-08-09 PROCEDURE — 3074F SYST BP LT 130 MM HG: CPT | Performed by: NURSE PRACTITIONER

## 2024-08-09 PROCEDURE — G8417 CALC BMI ABV UP PARAM F/U: HCPCS | Performed by: NURSE PRACTITIONER

## 2024-08-09 PROCEDURE — 3079F DIAST BP 80-89 MM HG: CPT | Performed by: NURSE PRACTITIONER

## 2024-08-09 RX ORDER — TRAMADOL HYDROCHLORIDE 50 MG/1
TABLET ORAL
COMMUNITY
Start: 2024-06-20

## 2024-08-09 SDOH — ECONOMIC STABILITY: FOOD INSECURITY: WITHIN THE PAST 12 MONTHS, YOU WORRIED THAT YOUR FOOD WOULD RUN OUT BEFORE YOU GOT MONEY TO BUY MORE.: NEVER TRUE

## 2024-08-09 SDOH — ECONOMIC STABILITY: FOOD INSECURITY: WITHIN THE PAST 12 MONTHS, THE FOOD YOU BOUGHT JUST DIDN'T LAST AND YOU DIDN'T HAVE MONEY TO GET MORE.: NEVER TRUE

## 2024-08-09 SDOH — ECONOMIC STABILITY: INCOME INSECURITY: HOW HARD IS IT FOR YOU TO PAY FOR THE VERY BASICS LIKE FOOD, HOUSING, MEDICAL CARE, AND HEATING?: NOT VERY HARD

## 2024-08-09 NOTE — PATIENT INSTRUCTIONS
Sprain/strain   First 24 hours, use ice to injury site for 15 minutes at a time.  After 48 hours, may alternate ice/heat 15 minutes each.  R.I. C.E. therapy = rest, ice, compression and elevation.  May use ace wrap to injured area for compression.  Use Ibuprofen or other antiinflammatory for pain and inflammation.   If no open skin areas, OTC topical lidocaine such as Icy Hot or capsaicin creams may be applied for pain relief.  Slow stretches of area may help reduce spasms and improve range of motion.   Alternate Voltaren cream with Biofreeze gel to site 2 to 3 times a day .

## 2024-08-09 NOTE — PROGRESS NOTES
[facility]  [id number] [facility]  [place] [address]  Dept: [phone]  Dept Fax: [phone]  Loc: [phone]    Sagar Tomas is a 63 y.o. male who presents today for his medical conditions/complaints as noted below.  Sagar Tomas is c/o of Follow-up (6-month-f/u.  Non-fasting today.  ), Hypertension (Doing pretty good.  Hasn't checked in few months.  Reports being better off Ibuprofen.  Ortho seen and treating patient with medication.  Patient canceled surgery due to decreased pain.  ), and Pain (Both knees and both hips.  Not responsive to steroid treatment per patient.  )        HPI:     HPI   This 63-year-old male presents today for 6-month follow-up.  States he is not fasting today.  He does state that his blood pressures feels like he is doing good he has not been checking it.  He does report that he thinks he got better once he stopped taking the ibuprofen.  States he was seen by Ortho and they had planned a surgery stating that he needs bilateral hip replacement as well as knee replacement but states that they tried steroid injections but that just caused more pain in his knees.  He states he would not be doing that therapy again.  Does states that they did start him on some Ultram.  At first they only gave him 30 and then they gave him 10 more states that just having 1 for when it was hurting really bad completely works to help take care of his pain and so we decided not to do the surgery at this time.  However he states when he called them back to see about getting a refill that he cannot get him to answer his calls.  He was wanting to see about getting a refill here in the office today.  Need bilateral hip replacements. And then knees but he reports the pain medication that they gave him helped control his pain. He states that he can take a half a tablet and it helps with his knee.     Chief Complaint   Patient presents with    Follow-up     6-month-f/u.

## 2024-08-13 ENCOUNTER — TELEPHONE (OUTPATIENT)
Dept: PRIMARY CARE CLINIC | Age: 64
End: 2024-08-13

## 2024-08-13 DIAGNOSIS — M25.551 PAIN OF RIGHT HIP: ICD-10-CM

## 2024-08-13 DIAGNOSIS — G89.29 CHRONIC PAIN OF BOTH KNEES: Primary | ICD-10-CM

## 2024-08-13 DIAGNOSIS — M25.561 CHRONIC PAIN OF BOTH KNEES: Primary | ICD-10-CM

## 2024-08-13 DIAGNOSIS — M25.562 CHRONIC PAIN OF BOTH KNEES: Primary | ICD-10-CM

## 2024-08-13 RX ORDER — TRAMADOL HYDROCHLORIDE 50 MG/1
50 TABLET ORAL EVERY 6 HOURS PRN
Qty: 30 TABLET | Refills: 0 | Status: SHIPPED | OUTPATIENT
Start: 2024-08-13 | End: 2024-09-12

## 2024-08-13 NOTE — TELEPHONE ENCOUNTER
----- Message from LILLIE Platt NP sent at 8/9/2024 12:41 PM CDT -----  Please contact [facility] and see if they are going to continue his pain medication or if it is okay for me cover him with some Ultram for his chronic pain?  They had previously started the regimen but since he is not doing surgery I do not know if they plan on continuing him on the chronic management?  I do not want to break any medication contracts.

## 2024-08-13 NOTE — TELEPHONE ENCOUNTER
Carlitos with Dr. Da Silva's office and he is not on medication contract. They said it would be fine for us to cover him with pain medication.

## 2024-09-12 RX ORDER — TRIAMCINOLONE ACETONIDE 1 MG/G
OINTMENT TOPICAL
Qty: 15 G | Refills: 0 | Status: SHIPPED | OUTPATIENT
Start: 2024-09-12

## 2024-10-10 DIAGNOSIS — M25.561 PAIN IN RIGHT KNEE: ICD-10-CM

## 2024-10-10 RX ORDER — TRAMADOL HYDROCHLORIDE 50 MG/1
50 TABLET ORAL EVERY 6 HOURS PRN
Qty: 30 TABLET | Refills: 0 | Status: SHIPPED | OUTPATIENT
Start: 2024-10-10 | End: 2024-11-09

## 2024-10-10 NOTE — TELEPHONE ENCOUNTER
PDMP Monitoring:    Last PDMP Andrea as Reviewed ([place]):  Review User Review Instant Review Result   ABEBECATHERINE PHAMZAINAB GILLILAND 8/13/2024 10:41 AM Reviewed PDMP [1]     Urine Drug Screenings (1 yr)    No resulted procedures found.       Medication Contract and Consent for Opioid Use Documents Filed       Patient Documents       Type of Document Status Date Received Received By Description    Medication Contract Received 8/9/2024 11:25 AM BRIAN DYKES Medication Contract 8/9/2024

## 2024-11-08 ENCOUNTER — OFFICE VISIT (OUTPATIENT)
Dept: PRIMARY CARE CLINIC | Age: 64
End: 2024-11-08

## 2024-11-08 VITALS
RESPIRATION RATE: 18 BRPM | SYSTOLIC BLOOD PRESSURE: 140 MMHG | DIASTOLIC BLOOD PRESSURE: 78 MMHG | OXYGEN SATURATION: 98 % | HEIGHT: 72 IN | WEIGHT: 241 LBS | HEART RATE: 74 BPM | BODY MASS INDEX: 32.64 KG/M2 | TEMPERATURE: 97.2 F

## 2024-11-08 DIAGNOSIS — Z00.00 INITIAL MEDICARE ANNUAL WELLNESS VISIT: Primary | ICD-10-CM

## 2024-11-08 DIAGNOSIS — N40.1 BENIGN PROSTATIC HYPERPLASIA WITH LOWER URINARY TRACT SYMPTOMS, SYMPTOM DETAILS UNSPECIFIED: ICD-10-CM

## 2024-11-08 DIAGNOSIS — I10 HYPERTENSION, UNSPECIFIED TYPE: ICD-10-CM

## 2024-11-08 DIAGNOSIS — Z12.5 SCREENING FOR PROSTATE CANCER: ICD-10-CM

## 2024-11-08 DIAGNOSIS — G89.29 CHRONIC PAIN OF BOTH KNEES: ICD-10-CM

## 2024-11-08 DIAGNOSIS — M25.562 CHRONIC PAIN OF BOTH KNEES: ICD-10-CM

## 2024-11-08 DIAGNOSIS — M25.561 CHRONIC PAIN OF BOTH KNEES: ICD-10-CM

## 2024-11-08 DIAGNOSIS — Z87.891 PERSONAL HISTORY OF TOBACCO USE: ICD-10-CM

## 2024-11-08 DIAGNOSIS — R97.20 ELEVATED PSA: ICD-10-CM

## 2024-11-08 LAB
ALBUMIN SERPL-MCNC: 4.4 G/DL (ref 3.5–5.2)
ALP SERPL-CCNC: 65 U/L (ref 40–129)
ALT SERPL-CCNC: 19 U/L (ref 5–41)
ANION GAP SERPL CALCULATED.3IONS-SCNC: 9 MMOL/L (ref 7–19)
AST SERPL-CCNC: 20 U/L (ref 5–40)
BASOPHILS # BLD: 0.1 K/UL (ref 0–0.2)
BASOPHILS NFR BLD: 0.8 % (ref 0–1)
BILIRUB SERPL-MCNC: 0.3 MG/DL (ref 0.2–1.2)
BUN SERPL-MCNC: 16 MG/DL (ref 8–23)
CALCIUM SERPL-MCNC: 9.6 MG/DL (ref 8.8–10.2)
CHLORIDE SERPL-SCNC: 103 MMOL/L (ref 98–111)
CHOLEST SERPL-MCNC: 223 MG/DL (ref 0–199)
CO2 SERPL-SCNC: 29 MMOL/L (ref 22–29)
CREAT SERPL-MCNC: 0.9 MG/DL (ref 0.7–1.2)
EOSINOPHIL # BLD: 0.3 K/UL (ref 0–0.6)
EOSINOPHIL NFR BLD: 3.4 % (ref 0–5)
ERYTHROCYTE [DISTWIDTH] IN BLOOD BY AUTOMATED COUNT: 12.7 % (ref 11.5–14.5)
GLUCOSE SERPL-MCNC: 91 MG/DL (ref 70–99)
HCT VFR BLD AUTO: 47.1 % (ref 42–52)
HDLC SERPL-MCNC: 60 MG/DL (ref 40–60)
HGB BLD-MCNC: 15.1 G/DL (ref 14–18)
IMM GRANULOCYTES # BLD: 0 K/UL
LDLC SERPL CALC-MCNC: 109 MG/DL
LYMPHOCYTES # BLD: 2.6 K/UL (ref 1.1–4.5)
LYMPHOCYTES NFR BLD: 28.4 % (ref 20–40)
MCH RBC QN AUTO: 31.4 PG (ref 27–31)
MCHC RBC AUTO-ENTMCNC: 32.1 G/DL (ref 33–37)
MCV RBC AUTO: 97.9 FL (ref 80–94)
MONOCYTES # BLD: 0.8 K/UL (ref 0–0.9)
MONOCYTES NFR BLD: 9.1 % (ref 0–10)
NEUTROPHILS # BLD: 5.3 K/UL (ref 1.5–7.5)
NEUTS SEG NFR BLD: 58 % (ref 50–65)
PLATELET # BLD AUTO: 225 K/UL (ref 130–400)
PMV BLD AUTO: 9.8 FL (ref 9.4–12.4)
POTASSIUM SERPL-SCNC: 5 MMOL/L (ref 3.5–5)
PROT SERPL-MCNC: 7.1 G/DL (ref 6.4–8.3)
PSA SERPL-MCNC: 1.67 NG/ML (ref 0–4)
RBC # BLD AUTO: 4.81 M/UL (ref 4.7–6.1)
SODIUM SERPL-SCNC: 141 MMOL/L (ref 136–145)
T4 FREE SERPL-MCNC: 1.2 NG/DL (ref 0.93–1.7)
TRIGL SERPL-MCNC: 271 MG/DL (ref 0–149)
TSH SERPL DL<=0.005 MIU/L-ACNC: 1.17 UIU/ML (ref 0.27–4.2)
WBC # BLD AUTO: 9.1 K/UL (ref 4.8–10.8)

## 2024-11-08 ASSESSMENT — PATIENT HEALTH QUESTIONNAIRE - PHQ9
1. LITTLE INTEREST OR PLEASURE IN DOING THINGS: NOT AT ALL
SUM OF ALL RESPONSES TO PHQ QUESTIONS 1-9: 0
SUM OF ALL RESPONSES TO PHQ9 QUESTIONS 1 & 2: 0
SUM OF ALL RESPONSES TO PHQ QUESTIONS 1-9: 0
2. FEELING DOWN, DEPRESSED OR HOPELESS: NOT AT ALL

## 2024-11-08 ASSESSMENT — LIFESTYLE VARIABLES
HOW OFTEN DO YOU HAVE A DRINK CONTAINING ALCOHOL: 2-3 TIMES A WEEK
HOW MANY STANDARD DRINKS CONTAINING ALCOHOL DO YOU HAVE ON A TYPICAL DAY: PATIENT UNABLE TO ANSWER

## 2024-11-08 NOTE — PATIENT INSTRUCTIONS
help keep your heart healthy and prevent heart disease. This lifestyle includes eating healthy, being active, staying at a weight that's healthy for you, and not smoking or using tobacco. It also includes taking medicines as directed, managing other health conditions, and trying to get a healthy amount of sleep.  Follow-up care is a key part of your treatment and safety. Be sure to make and go to all appointments, and call your doctor if you are having problems. It's also a good idea to know your test results and keep a list of the medicines you take.  How can you care for yourself at home?  Diet    Use less salt when you cook and eat. This helps lower your blood pressure. Taste food before salting. Add only a little salt when you think you need it. With time, your taste buds will adjust to less salt.     Eat fewer snack items, fast foods, canned soups, and other high-salt, high-fat, processed foods.     Read food labels and try to avoid saturated and trans fats. They increase your risk of heart disease by raising cholesterol levels.     Limit the amount of solid fat--butter, margarine, and shortening--you eat. Use olive, peanut, or canola oil when you cook. Bake, broil, and steam foods instead of frying them.     Eat a variety of fruit and vegetables every day. Dark green, deep orange, red, or yellow fruits and vegetables are especially good for you. Examples include spinach, carrots, peaches, and berries.     Foods high in fiber can reduce your cholesterol and provide important vitamins and minerals. High-fiber foods include whole-grain cereals and breads, oatmeal, beans, brown rice, citrus fruits, and apples.     Eat lean proteins. Heart-healthy proteins include seafood, lean meats and poultry, eggs, beans, peas, nuts, seeds, and soy products.     Limit drinks and foods with added sugar. These include candy, desserts, and soda pop.   Heart-healthy lifestyle    If your doctor recommends it, get more exercise. For

## 2024-11-08 NOTE — PROGRESS NOTES
Authorizing Provider   traMADol (ULTRAM) 50 MG tablet Take 1 tablet by mouth every 6 hours as needed for Pain for up to 30 days. Max Daily Amount: 200 mg Yes Emily [place] APRN - NP   triamcinolone (KENALOG) 0.1 % ointment Apply 1/4 inch bead of ointment to open wound twice a day. Yes Emily [place] APRN - NP   lisinopril (PRINIVIL;ZESTRIL) 20 MG tablet Take 1 tablet by mouth daily Yes Emily [place] APRN - NP   nebivolol (BYSTOLIC) 5 MG tablet Take 1 tablet by mouth daily Yes Emily [place] APRN - NP   ondansetron (ZOFRAN) 4 MG tablet Take one tablet every 4 hours as needed for nausea from colon prep  Patient not taking: Reported on 3/14/2024  Afua Tai MD   tiZANidine (ZANAFLEX) 4 MG tablet Take 1 tablet by mouth 3 times daily as needed (muscle spasm)  Patient not taking: Reported on 3/14/2024  Emily Israel APRN - NP       CareTeam (Including outside providers/suppliers regularly involved in providing care):   Patient Care Team:  Emily Israel APRN - NP as PCP - General (Family Nurse Practitioner)  Emily Israel APRN - NP as PCP - Empaneled Provider      Reviewed and updated this visit:  Tobacco  Allergies  Meds  Med Hx  Surg Hx  Soc Hx  Fam Hx          Discussed with the patient the current USPSTF guidelines released March 9, 2021 for screening for lung cancer.    For adults aged 50 to 80 years who have a 20 pack-year smoking history and currently smoke or have quit within the past 15 years the grade B recommendation is to:  Screen for lung cancer with low-dose computed tomography (LDCT) every year.  Stop screening once a person has not smoked for 15 years or has a health problem that limits life expectancy or the ability to have lung surgery.    The patient  reports that he has been smoking cigarettes. He has a 20 pack-year smoking history. He has never used smokeless tobacco.. Discussed with patient the risks and benefits of screening, including over-diagnosis, false positive

## 2024-11-14 ENCOUNTER — TELEPHONE (OUTPATIENT)
Dept: OTHER | Age: 64
End: 2024-11-14

## 2024-11-14 ENCOUNTER — HOSPITAL ENCOUNTER (OUTPATIENT)
Dept: GENERAL RADIOLOGY | Age: 64
Discharge: HOME | End: 2024-11-14
Payer: MEDICARE

## 2024-11-14 DIAGNOSIS — Z87.891 PERSONAL HISTORY OF TOBACCO USE: ICD-10-CM

## 2024-11-14 PROCEDURE — 71271 CT THORAX LUNG CANCER SCR C-: CPT

## 2024-11-14 NOTE — TELEPHONE ENCOUNTER
LILLIE Plascencia ordered smoking cessation referral for patient due to nicotine dependence.  Offered smoking cessation class beginning in January.  Left message with all pertinent information provided.

## 2024-12-06 ENCOUNTER — OFFICE VISIT (OUTPATIENT)
Dept: PRIMARY CARE CLINIC | Age: 64
End: 2024-12-06
Payer: MEDICARE

## 2024-12-06 VITALS
HEIGHT: 72 IN | BODY MASS INDEX: 33.05 KG/M2 | SYSTOLIC BLOOD PRESSURE: 130 MMHG | WEIGHT: 244 LBS | RESPIRATION RATE: 18 BRPM | HEART RATE: 68 BPM | TEMPERATURE: 97.1 F | DIASTOLIC BLOOD PRESSURE: 72 MMHG | OXYGEN SATURATION: 98 %

## 2024-12-06 DIAGNOSIS — I10 HYPERTENSION, UNSPECIFIED TYPE: Primary | ICD-10-CM

## 2024-12-06 DIAGNOSIS — G89.29 CHRONIC PAIN OF BOTH KNEES: ICD-10-CM

## 2024-12-06 DIAGNOSIS — J43.8 OTHER EMPHYSEMA (HCC): ICD-10-CM

## 2024-12-06 DIAGNOSIS — M25.561 CHRONIC PAIN OF BOTH KNEES: ICD-10-CM

## 2024-12-06 DIAGNOSIS — M25.562 CHRONIC PAIN OF BOTH KNEES: ICD-10-CM

## 2024-12-06 PROCEDURE — G8427 DOCREV CUR MEDS BY ELIG CLIN: HCPCS | Performed by: NURSE PRACTITIONER

## 2024-12-06 PROCEDURE — 4004F PT TOBACCO SCREEN RCVD TLK: CPT | Performed by: NURSE PRACTITIONER

## 2024-12-06 PROCEDURE — 3023F SPIROM DOC REV: CPT | Performed by: NURSE PRACTITIONER

## 2024-12-06 PROCEDURE — 99214 OFFICE O/P EST MOD 30 MIN: CPT | Performed by: NURSE PRACTITIONER

## 2024-12-06 PROCEDURE — G8417 CALC BMI ABV UP PARAM F/U: HCPCS | Performed by: NURSE PRACTITIONER

## 2024-12-06 PROCEDURE — 3017F COLORECTAL CA SCREEN DOC REV: CPT | Performed by: NURSE PRACTITIONER

## 2024-12-06 PROCEDURE — G8484 FLU IMMUNIZE NO ADMIN: HCPCS | Performed by: NURSE PRACTITIONER

## 2024-12-06 PROCEDURE — 3078F DIAST BP <80 MM HG: CPT | Performed by: NURSE PRACTITIONER

## 2024-12-06 PROCEDURE — 3075F SYST BP GE 130 - 139MM HG: CPT | Performed by: NURSE PRACTITIONER

## 2024-12-06 RX ORDER — TRAMADOL HYDROCHLORIDE 50 MG/1
50 TABLET ORAL EVERY 8 HOURS PRN
Qty: 30 TABLET | Refills: 0 | Status: SHIPPED | OUTPATIENT
Start: 2024-12-06 | End: 2025-01-05

## 2024-12-06 ASSESSMENT — ENCOUNTER SYMPTOMS
COUGH: 1
ALLERGIC/IMMUNOLOGIC NEGATIVE: 1
GASTROINTESTINAL NEGATIVE: 1
WHEEZING: 1
EYES NEGATIVE: 1

## 2024-12-06 NOTE — PROGRESS NOTES
[facility]  [address]  [place] [address]  Dept: [phone]  Dept Fax: [phone]  Loc: [phone]    Saagr Tomas is a 64 y.o. male who presents today for his medical conditions/complaints as noted below.  Sagar Tomas is c/o of 1 Month Follow-Up (Pt is here for 1 month follow up on HTN. Pt states his BP has been averaging 120/68 and HR is 86. No new concerns. )        HPI:     HPI this 64-year-old male presents today for 1 month follow-up on his blood pressure.  His state pressure has been averaging around 120/68.  With a heart rate in the 80s.  He is not having any side effects or adverse reactions to his medications.  He denies any new concerns.  He does request a refill on his Ultram.  States that this really does help with his chronic pain he only takes them as needed.  He does report that he has had a little more wheezing and congestion because he does use a wood stove to heat his home and occasionally the smoke does come back into the house  Chief Complaint   Patient presents with    1 Month Follow-Up     Pt is here for 1 month follow up on HTN. Pt states his BP has been averaging 120/68 and HR is 86. No new concerns.      Past Medical History:   Diagnosis Date    History of kidney stones     Hypertension     Osteoarthritis       Past Surgical History:   Procedure Laterality Date    COLONOSCOPY N/A 11/21/2023    Dr Tai, Severe diverticulosis w patchy spring diverticular erythema in left colon, int hem Gr 1 wo bleeding, suboptimal, repeat tomorrow    COLONOSCOPY N/A 11/22/2023    Dr Tai, TA (-)Dysplasia, sm being appearing sigmoid colon polyp-lost in area, Severe diverticulosis left colon, int hem Gr 2, 5 year recall           12/6/2024     9:37 AM 11/8/2024     9:09 AM 11/8/2024     8:36 AM 8/9/2024     8:55 AM 3/14/2024     8:45 AM 3/14/2024     8:02 AM   Vitals   SYSTOLIC 130 140 162 122 138 160   DIASTOLIC 72 78 94 80 86 90   Site

## 2025-01-15 RX ORDER — LISINOPRIL 20 MG/1
20 TABLET ORAL DAILY
Qty: 90 TABLET | Refills: 1 | Status: SHIPPED | OUTPATIENT
Start: 2025-01-15

## 2025-01-15 RX ORDER — NEBIVOLOL 5 MG/1
5 TABLET ORAL DAILY
Qty: 90 TABLET | Refills: 1 | Status: SHIPPED | OUTPATIENT
Start: 2025-01-15

## 2025-02-03 DIAGNOSIS — M25.562 CHRONIC PAIN OF BOTH KNEES: ICD-10-CM

## 2025-02-03 DIAGNOSIS — G89.29 CHRONIC PAIN OF BOTH KNEES: ICD-10-CM

## 2025-02-03 DIAGNOSIS — M25.561 CHRONIC PAIN OF BOTH KNEES: ICD-10-CM

## 2025-02-03 RX ORDER — TRAMADOL HYDROCHLORIDE 50 MG/1
50 TABLET ORAL EVERY 8 HOURS PRN
Qty: 30 TABLET | Refills: 0 | Status: SHIPPED | OUTPATIENT
Start: 2025-02-03 | End: 2025-03-05

## 2025-02-03 NOTE — TELEPHONE ENCOUNTER
PDMP Monitoring:    Last PDMP Andrea as Reviewed ([place]):  Review User Review Instant Review Result   CRISTOPHER ABEBE DARSHANA 12/6/2024 12:59 PM Reviewed PDMP [1]     Urine Drug Screenings (1 yr)    No resulted procedures found.       Medication Contract and Consent for Opioid Use Documents Filed       Patient Documents       Type of Document Status Date Received Received By Description    Medication Contract Received 8/9/2024 11:25 AM BRIAN DYKES Medication Contract 8/9/2024

## 2025-04-10 DIAGNOSIS — M25.561 CHRONIC PAIN OF BOTH KNEES: ICD-10-CM

## 2025-04-10 DIAGNOSIS — M25.562 CHRONIC PAIN OF BOTH KNEES: ICD-10-CM

## 2025-04-10 DIAGNOSIS — G89.29 CHRONIC PAIN OF BOTH KNEES: ICD-10-CM

## 2025-04-10 RX ORDER — NEBIVOLOL 5 MG/1
5 TABLET ORAL DAILY
Qty: 90 TABLET | Refills: 1 | Status: SHIPPED | OUTPATIENT
Start: 2025-04-10

## 2025-04-10 RX ORDER — LISINOPRIL 20 MG/1
20 TABLET ORAL DAILY
Qty: 90 TABLET | Refills: 1 | Status: SHIPPED | OUTPATIENT
Start: 2025-04-10

## 2025-04-10 RX ORDER — TRAMADOL HYDROCHLORIDE 50 MG/1
50 TABLET ORAL EVERY 8 HOURS PRN
Qty: 30 TABLET | Refills: 0 | Status: SHIPPED | OUTPATIENT
Start: 2025-04-10 | End: 2025-05-10

## 2025-04-10 NOTE — TELEPHONE ENCOUNTER
PDMP Monitoring:    Last PDMP Andrea as Reviewed ([place]):  Review User Review Instant Review Result   ABEBECRISTOPHER PHAM DARSHANA 2/3/2025  1:20 PM Reviewed PDMP [1]     Urine Drug Screenings (1 yr)    No resulted procedures found.       Medication Contract and Consent for Opioid Use Documents Filed       Patient Documents       Type of Document Status Date Received Received By Description    Medication Contract Received 8/9/2024 11:25 AM BRIAN DYKES Medication Contract 8/9/2024

## 2025-06-06 ENCOUNTER — OFFICE VISIT (OUTPATIENT)
Dept: PRIMARY CARE CLINIC | Age: 65
End: 2025-06-06
Payer: MEDICARE

## 2025-06-06 VITALS
TEMPERATURE: 97.2 F | WEIGHT: 232 LBS | RESPIRATION RATE: 18 BRPM | HEIGHT: 72 IN | BODY MASS INDEX: 31.42 KG/M2 | HEART RATE: 87 BPM | DIASTOLIC BLOOD PRESSURE: 90 MMHG | SYSTOLIC BLOOD PRESSURE: 142 MMHG | OXYGEN SATURATION: 98 %

## 2025-06-06 DIAGNOSIS — I10 HYPERTENSION, UNSPECIFIED TYPE: Primary | ICD-10-CM

## 2025-06-06 DIAGNOSIS — J43.8 OTHER EMPHYSEMA (HCC): ICD-10-CM

## 2025-06-06 PROCEDURE — G8417 CALC BMI ABV UP PARAM F/U: HCPCS | Performed by: NURSE PRACTITIONER

## 2025-06-06 PROCEDURE — 99214 OFFICE O/P EST MOD 30 MIN: CPT | Performed by: NURSE PRACTITIONER

## 2025-06-06 PROCEDURE — G8427 DOCREV CUR MEDS BY ELIG CLIN: HCPCS | Performed by: NURSE PRACTITIONER

## 2025-06-06 PROCEDURE — 3080F DIAST BP >= 90 MM HG: CPT | Performed by: NURSE PRACTITIONER

## 2025-06-06 PROCEDURE — 4004F PT TOBACCO SCREEN RCVD TLK: CPT | Performed by: NURSE PRACTITIONER

## 2025-06-06 PROCEDURE — 3077F SYST BP >= 140 MM HG: CPT | Performed by: NURSE PRACTITIONER

## 2025-06-06 PROCEDURE — 3023F SPIROM DOC REV: CPT | Performed by: NURSE PRACTITIONER

## 2025-06-06 PROCEDURE — 3017F COLORECTAL CA SCREEN DOC REV: CPT | Performed by: NURSE PRACTITIONER

## 2025-06-06 SDOH — ECONOMIC STABILITY: FOOD INSECURITY: WITHIN THE PAST 12 MONTHS, THE FOOD YOU BOUGHT JUST DIDN'T LAST AND YOU DIDN'T HAVE MONEY TO GET MORE.: NEVER TRUE

## 2025-06-06 SDOH — ECONOMIC STABILITY: FOOD INSECURITY: WITHIN THE PAST 12 MONTHS, YOU WORRIED THAT YOUR FOOD WOULD RUN OUT BEFORE YOU GOT MONEY TO BUY MORE.: NEVER TRUE

## 2025-06-06 ASSESSMENT — ENCOUNTER SYMPTOMS
GASTROINTESTINAL NEGATIVE: 1
COUGH: 0
EYES NEGATIVE: 1
SHORTNESS OF BREATH: 0
WHEEZING: 0
RESPIRATORY NEGATIVE: 1
ALLERGIC/IMMUNOLOGIC NEGATIVE: 1

## 2025-06-06 ASSESSMENT — PATIENT HEALTH QUESTIONNAIRE - PHQ9
SUM OF ALL RESPONSES TO PHQ QUESTIONS 1-9: 0
1. LITTLE INTEREST OR PLEASURE IN DOING THINGS: NOT AT ALL
SUM OF ALL RESPONSES TO PHQ QUESTIONS 1-9: 0
2. FEELING DOWN, DEPRESSED OR HOPELESS: NOT AT ALL
SUM OF ALL RESPONSES TO PHQ QUESTIONS 1-9: 0
SUM OF ALL RESPONSES TO PHQ QUESTIONS 1-9: 0

## 2025-06-13 DIAGNOSIS — M25.561 CHRONIC PAIN OF BOTH KNEES: ICD-10-CM

## 2025-06-13 DIAGNOSIS — M25.562 CHRONIC PAIN OF BOTH KNEES: ICD-10-CM

## 2025-06-13 DIAGNOSIS — G89.29 CHRONIC PAIN OF BOTH KNEES: ICD-10-CM

## 2025-06-13 RX ORDER — TRAMADOL HYDROCHLORIDE 50 MG/1
50 TABLET ORAL EVERY 8 HOURS PRN
Qty: 30 TABLET | Refills: 0 | Status: SHIPPED | OUTPATIENT
Start: 2025-06-13 | End: 2025-07-13

## 2025-06-13 NOTE — TELEPHONE ENCOUNTER
PDMP Monitoring:    Last PDMP Andrea as Reviewed ([place]):  Review User Review Instant Review Result   ABEBECRISTOPHER PHAM DARSHANA 4/10/2025  5:05 PM Reviewed PDMP [1]     Urine Drug Screenings (1 yr)    No resulted procedures found.       Medication Contract and Consent for Opioid Use Documents Filed       Patient Documents       Type of Document Status Date Received Received By Description    Medication Contract Received 8/9/2024 11:25 AM BRIAN DYKES Medication Contract 8/9/2024

## 2025-06-20 ENCOUNTER — OFFICE VISIT (OUTPATIENT)
Dept: PRIMARY CARE CLINIC | Age: 65
End: 2025-06-20
Payer: MEDICARE

## 2025-06-20 VITALS
OXYGEN SATURATION: 99 % | DIASTOLIC BLOOD PRESSURE: 84 MMHG | RESPIRATION RATE: 18 BRPM | HEIGHT: 72 IN | HEART RATE: 67 BPM | BODY MASS INDEX: 30.61 KG/M2 | TEMPERATURE: 98.2 F | WEIGHT: 226 LBS | SYSTOLIC BLOOD PRESSURE: 128 MMHG

## 2025-06-20 DIAGNOSIS — G89.29 CHRONIC PAIN OF BOTH KNEES: ICD-10-CM

## 2025-06-20 DIAGNOSIS — I10 HYPERTENSION, UNSPECIFIED TYPE: Primary | ICD-10-CM

## 2025-06-20 DIAGNOSIS — M25.561 CHRONIC PAIN OF BOTH KNEES: ICD-10-CM

## 2025-06-20 DIAGNOSIS — M25.562 CHRONIC PAIN OF BOTH KNEES: ICD-10-CM

## 2025-06-20 DIAGNOSIS — Z79.899 MEDICATION MANAGEMENT: ICD-10-CM

## 2025-06-20 LAB

## 2025-06-20 PROCEDURE — 3079F DIAST BP 80-89 MM HG: CPT | Performed by: NURSE PRACTITIONER

## 2025-06-20 PROCEDURE — 80305 DRUG TEST PRSMV DIR OPT OBS: CPT | Performed by: NURSE PRACTITIONER

## 2025-06-20 PROCEDURE — 3074F SYST BP LT 130 MM HG: CPT | Performed by: NURSE PRACTITIONER

## 2025-06-20 PROCEDURE — G8417 CALC BMI ABV UP PARAM F/U: HCPCS | Performed by: NURSE PRACTITIONER

## 2025-06-20 PROCEDURE — 4004F PT TOBACCO SCREEN RCVD TLK: CPT | Performed by: NURSE PRACTITIONER

## 2025-06-20 PROCEDURE — G8427 DOCREV CUR MEDS BY ELIG CLIN: HCPCS | Performed by: NURSE PRACTITIONER

## 2025-06-20 PROCEDURE — 99214 OFFICE O/P EST MOD 30 MIN: CPT | Performed by: NURSE PRACTITIONER

## 2025-06-20 PROCEDURE — 3017F COLORECTAL CA SCREEN DOC REV: CPT | Performed by: NURSE PRACTITIONER

## 2025-06-20 NOTE — PROGRESS NOTES
[facility]  [place] [id number]  [facility] [address] [phone]       Sagar Tomas is a 64 y.o. male who presents today for his medical conditions/complaints as noted below.  Sagar Tomas is c/o of 2 Week Follow-Up (Pt is here for 2 week follow up on BP. Pt states his BP continued to run high. )        HPI:     History of Present Illness  The patient presents for evaluation of blood pressure.    He has been monitoring his blood pressure at home, recording two readings daily. He reports no episodes of dizziness or lightheadedness when his blood pressure is within the range of 120s over 80s. His current medication regimen includes lisinopril 20 mg once daily and nebivolol 5 mg. He maintains an active lifestyle, engaging in yard work that involves frequent bending. He also ensures adequate hydration by consuming a significant amount of water daily.    SOCIAL HISTORY  The patient drinks four or five beers occasionally.       Chief Complaint   Patient presents with    2 Week Follow-Up     Pt is here for 2 week follow up on BP. Pt states his BP continued to run high.      Past Medical History:   Diagnosis Date    History of kidney stones     Hypertension     Osteoarthritis       Past Surgical History:   Procedure Laterality Date    COLONOSCOPY N/A 11/21/2023    Dr Tai, Severe diverticulosis w patchy spring diverticular erythema in left colon, int hem Gr 1 wo bleeding, suboptimal, repeat tomorrow    COLONOSCOPY N/A 11/22/2023    Dr Tai, TA (-)Dysplasia, sm being appearing sigmoid colon polyp-lost in area, Severe diverticulosis left colon, int hem Gr 2, 5 year recall           6/20/2025    10:17 AM 6/20/2025    10:15 AM 6/20/2025    10:14 AM 6/20/2025    10:08 AM 6/6/2025     1:38 PM 6/6/2025     1:20 PM   Vitals   SYSTOLIC 128 163 136 160 142 162   DIASTOLIC 84 102 84 108 90 92   BP Site Right Upper Arm Right Upper Arm Left Upper Arm Left Upper

## 2025-06-23 ENCOUNTER — RESULTS FOLLOW-UP (OUTPATIENT)
Dept: PRIMARY CARE CLINIC | Age: 65
End: 2025-06-23

## 2025-06-23 ASSESSMENT — ENCOUNTER SYMPTOMS
EYES NEGATIVE: 1
COUGH: 0
ALLERGIC/IMMUNOLOGIC NEGATIVE: 1
RESPIRATORY NEGATIVE: 1
BACK PAIN: 1
WHEEZING: 0
SHORTNESS OF BREATH: 0
GASTROINTESTINAL NEGATIVE: 1

## 2025-07-11 RX ORDER — LISINOPRIL 20 MG/1
20 TABLET ORAL DAILY
Qty: 90 TABLET | Refills: 1 | Status: SHIPPED | OUTPATIENT
Start: 2025-07-11

## 2025-07-11 RX ORDER — NEBIVOLOL 5 MG/1
5 TABLET ORAL DAILY
Qty: 90 TABLET | Refills: 1 | Status: SHIPPED | OUTPATIENT
Start: 2025-07-11

## 2025-08-08 DIAGNOSIS — G89.29 CHRONIC PAIN OF BOTH KNEES: Primary | ICD-10-CM

## 2025-08-08 DIAGNOSIS — M25.562 CHRONIC PAIN OF BOTH KNEES: Primary | ICD-10-CM

## 2025-08-08 DIAGNOSIS — M25.561 CHRONIC PAIN OF BOTH KNEES: Primary | ICD-10-CM

## 2025-08-08 RX ORDER — TRAMADOL HYDROCHLORIDE 50 MG/1
50 TABLET ORAL EVERY 8 HOURS PRN
COMMUNITY
End: 2025-08-08 | Stop reason: SDUPTHER

## 2025-08-08 RX ORDER — TRAMADOL HYDROCHLORIDE 50 MG/1
50 TABLET ORAL 2 TIMES DAILY PRN
Qty: 60 TABLET | Refills: 0 | Status: SHIPPED | OUTPATIENT
Start: 2025-08-08 | End: 2025-09-07

## (undated) DEVICE — ENDO KIT,LOURDES HOSPITAL: Brand: MEDLINE INDUSTRIES, INC.

## (undated) DEVICE — ADAPTER CLEANING PORPOISE CLEANING

## (undated) DEVICE — SPONGE ENDOSCP CLN BS INF PREVENTION KOALA

## (undated) DEVICE — SINGLE PORT MANIFOLD: Brand: NEPTUNE 2

## (undated) DEVICE — CANNULA NSL AD L7FT DIV O2 CO2 W/ M LUERLOCK TRMPT CONN

## (undated) DEVICE — BRUSH ENDOSCP 2 END CHN HEDGEHOG